# Patient Record
Sex: FEMALE | Race: WHITE | NOT HISPANIC OR LATINO | Employment: FULL TIME | URBAN - METROPOLITAN AREA
[De-identification: names, ages, dates, MRNs, and addresses within clinical notes are randomized per-mention and may not be internally consistent; named-entity substitution may affect disease eponyms.]

---

## 2017-01-04 ENCOUNTER — ALLSCRIPTS OFFICE VISIT (OUTPATIENT)
Dept: OTHER | Facility: OTHER | Age: 52
End: 2017-01-04

## 2017-01-10 ENCOUNTER — GENERIC CONVERSION - ENCOUNTER (OUTPATIENT)
Dept: OTHER | Facility: OTHER | Age: 52
End: 2017-01-10

## 2017-01-10 LAB
25(OH)D3 SERPL-MCNC: 29 NG/ML (ref 30–100)
BASOPHILS # BLD AUTO: 0 X10E3/UL (ref 0–0.2)
BASOPHILS # BLD AUTO: 1 %
DEPRECATED RDW RBC AUTO: 13.8 % (ref 12.3–15.4)
EOSINOPHIL # BLD AUTO: 0.1 X10E3/UL (ref 0–0.4)
EOSINOPHIL # BLD AUTO: 3 %
HCT VFR BLD AUTO: 42.1 % (ref 34–46.6)
HGB BLD-MCNC: 14.2 G/DL (ref 11.1–15.9)
IMM.GRANULOCYTES (CD4/8) (HISTORICAL): 0 %
IMM.GRANULOCYTES (CD4/8) (HISTORICAL): 0 X10E3/UL (ref 0–0.1)
LYMPHOCYTES # BLD AUTO: 1.6 X10E3/UL (ref 0.7–3.1)
LYMPHOCYTES # BLD AUTO: 38 %
MCH RBC QN AUTO: 28.3 PG (ref 26.6–33)
MCHC RBC AUTO-ENTMCNC: 33.7 G/DL (ref 31.5–35.7)
MCV RBC AUTO: 84 FL (ref 79–97)
MONOCYTES # BLD AUTO: 0.3 X10E3/UL (ref 0.1–0.9)
MONOCYTES (HISTORICAL): 8 %
NEUTROPHILS # BLD AUTO: 2.1 X10E3/UL (ref 1.4–7)
NEUTROPHILS # BLD AUTO: 50 %
PLATELET # BLD AUTO: 222 X10E3/UL (ref 150–379)
RBC (HISTORICAL): 5.01 X10E6/UL (ref 3.77–5.28)
WBC # BLD AUTO: 4.2 X10E3/UL (ref 3.4–10.8)

## 2017-01-11 ENCOUNTER — GENERIC CONVERSION - ENCOUNTER (OUTPATIENT)
Dept: OTHER | Facility: OTHER | Age: 52
End: 2017-01-11

## 2017-01-11 LAB
A/G RATIO (HISTORICAL): 1.5 (ref 1.1–2.5)
ALBUMIN SERPL BCP-MCNC: 4.4 G/DL (ref 3.5–5.5)
ALP SERPL-CCNC: 110 IU/L (ref 39–117)
ALT SERPL W P-5'-P-CCNC: 13 IU/L (ref 0–32)
AST SERPL W P-5'-P-CCNC: 18 IU/L (ref 0–40)
BILIRUB SERPL-MCNC: 0.5 MG/DL (ref 0–1.2)
BUN SERPL-MCNC: 11 MG/DL (ref 6–24)
BUN/CREA RATIO (HISTORICAL): 16 (ref 9–23)
CALCIUM SERPL-MCNC: 10 MG/DL (ref 8.7–10.2)
CHLORIDE SERPL-SCNC: 98 MMOL/L (ref 96–106)
CHOLEST SERPL-MCNC: 218 MG/DL (ref 100–199)
CHOLEST/HDLC SERPL: 3.4 RATIO UNITS (ref 0–4.4)
CO2 SERPL-SCNC: 25 MMOL/L (ref 18–29)
CREAT SERPL-MCNC: 0.67 MG/DL (ref 0.57–1)
EGFR AFRICAN AMERICAN (HISTORICAL): 118 ML/MIN/1.73
EGFR-AMERICAN CALC (HISTORICAL): 102 ML/MIN/1.73
GLUCOSE SERPL-MCNC: 105 MG/DL (ref 65–99)
HDLC SERPL-MCNC: 64 MG/DL
INTERPRETATION (HISTORICAL): NORMAL
LDLC SERPL CALC-MCNC: 131 MG/DL (ref 0–99)
POTASSIUM SERPL-SCNC: 4.4 MMOL/L (ref 3.5–5.2)
SODIUM SERPL-SCNC: 139 MMOL/L (ref 134–144)
TOT. GLOBULIN, SERUM (HISTORICAL): 3 G/DL (ref 1.5–4.5)
TOTAL PROTEIN (HISTORICAL): 7.4 G/DL (ref 6–8.5)
TRIGL SERPL-MCNC: 116 MG/DL (ref 0–149)
TSH SERPL DL<=0.05 MIU/L-ACNC: 1.07 UIU/ML (ref 0.45–4.5)
VLDLC SERPL CALC-MCNC: 23 MG/DL (ref 5–40)

## 2017-01-16 ENCOUNTER — ALLSCRIPTS OFFICE VISIT (OUTPATIENT)
Dept: OTHER | Facility: OTHER | Age: 52
End: 2017-01-16

## 2017-01-16 DIAGNOSIS — Z12.31 ENCOUNTER FOR SCREENING MAMMOGRAM FOR MALIGNANT NEOPLASM OF BREAST: ICD-10-CM

## 2017-02-16 ENCOUNTER — HOSPITAL ENCOUNTER (OUTPATIENT)
Dept: RADIOLOGY | Facility: HOSPITAL | Age: 52
Discharge: HOME | End: 2017-02-16
Attending: ORTHOPAEDIC SURGERY
Payer: COMMERCIAL

## 2017-02-16 ENCOUNTER — ALLSCRIPTS OFFICE VISIT (OUTPATIENT)
Dept: OTHER | Facility: OTHER | Age: 52
End: 2017-02-16

## 2017-02-16 DIAGNOSIS — M25.529 PAIN IN ELBOW: ICD-10-CM

## 2017-02-16 PROCEDURE — 73070 X-RAY EXAM OF ELBOW: CPT

## 2017-02-16 PROCEDURE — 73090 X-RAY EXAM OF FOREARM: CPT

## 2017-02-25 ENCOUNTER — APPOINTMENT (EMERGENCY)
Dept: RADIOLOGY | Facility: HOSPITAL | Age: 52
End: 2017-02-25
Payer: COMMERCIAL

## 2017-02-25 ENCOUNTER — HOSPITAL ENCOUNTER (OUTPATIENT)
Facility: HOSPITAL | Age: 52
Setting detail: OBSERVATION
Discharge: HOME | End: 2017-02-27
Attending: EMERGENCY MEDICINE | Admitting: ORTHOPAEDIC SURGERY
Payer: COMMERCIAL

## 2017-02-25 DIAGNOSIS — Z96.9 RETAINED ORTHOPEDIC HARDWARE: ICD-10-CM

## 2017-02-25 DIAGNOSIS — M25.522 LEFT ELBOW PAIN: Primary | ICD-10-CM

## 2017-02-25 LAB
ABO GROUP BLD: NORMAL
ANION GAP SERPL CALCULATED.3IONS-SCNC: 6 MMOL/L (ref 4–13)
ATRIAL RATE: 72 BPM
BASOPHILS # BLD AUTO: 0.01 THOUSANDS/ΜL (ref 0–0.1)
BASOPHILS NFR BLD AUTO: 0 % (ref 0–1)
BLD GP AB SCN SERPL QL: NEGATIVE
BUN SERPL-MCNC: 12 MG/DL (ref 5–25)
CALCIUM SERPL-MCNC: 9.4 MG/DL (ref 8.3–10.1)
CHLORIDE SERPL-SCNC: 101 MMOL/L (ref 100–108)
CO2 SERPL-SCNC: 29 MMOL/L (ref 21–32)
CREAT SERPL-MCNC: 0.67 MG/DL (ref 0.6–1.3)
CRP SERPL HS-MCNC: >90 MG/L
EOSINOPHIL # BLD AUTO: 0.01 THOUSAND/ΜL (ref 0–0.61)
EOSINOPHIL NFR BLD AUTO: 0 % (ref 0–6)
ERYTHROCYTE [DISTWIDTH] IN BLOOD BY AUTOMATED COUNT: 13.1 % (ref 11.6–15.1)
ERYTHROCYTE [SEDIMENTATION RATE] IN BLOOD: 44 MM/HOUR (ref 0–20)
GFR SERPL CREATININE-BSD FRML MDRD: >60 ML/MIN/1.73SQ M
GLUCOSE SERPL-MCNC: 110 MG/DL (ref 65–140)
HCT VFR BLD AUTO: 39.8 % (ref 34.8–46.1)
HGB BLD-MCNC: 13.6 G/DL (ref 11.5–15.4)
LYMPHOCYTES # BLD AUTO: 1.68 THOUSANDS/ΜL (ref 0.6–4.47)
LYMPHOCYTES NFR BLD AUTO: 19 % (ref 14–44)
MCH RBC QN AUTO: 29.2 PG (ref 26.8–34.3)
MCHC RBC AUTO-ENTMCNC: 34.2 G/DL (ref 31.4–37.4)
MCV RBC AUTO: 85 FL (ref 82–98)
MONOCYTES # BLD AUTO: 0.87 THOUSAND/ΜL (ref 0.17–1.22)
MONOCYTES NFR BLD AUTO: 10 % (ref 4–12)
NEUTROPHILS # BLD AUTO: 6.26 THOUSANDS/ΜL (ref 1.85–7.62)
NEUTS SEG NFR BLD AUTO: 71 % (ref 43–75)
NRBC BLD AUTO-RTO: 0 /100 WBCS
P AXIS: 66 DEGREES
PLATELET # BLD AUTO: 185 THOUSANDS/UL (ref 149–390)
PMV BLD AUTO: 9.7 FL (ref 8.9–12.7)
POTASSIUM SERPL-SCNC: 3.6 MMOL/L (ref 3.5–5.3)
PR INTERVAL: 134 MS
QRS AXIS: 73 DEGREES
QRSD INTERVAL: 82 MS
QT INTERVAL: 372 MS
QTC INTERVAL: 407 MS
RBC # BLD AUTO: 4.66 MILLION/UL (ref 3.81–5.12)
RH BLD: POSITIVE
SODIUM SERPL-SCNC: 136 MMOL/L (ref 136–145)
T WAVE AXIS: 45 DEGREES
VENTRICULAR RATE: 72 BPM
WBC # BLD AUTO: 8.85 THOUSAND/UL (ref 4.31–10.16)

## 2017-02-25 PROCEDURE — 36415 COLL VENOUS BLD VENIPUNCTURE: CPT | Performed by: EMERGENCY MEDICINE

## 2017-02-25 PROCEDURE — 86141 C-REACTIVE PROTEIN HS: CPT | Performed by: EMERGENCY MEDICINE

## 2017-02-25 PROCEDURE — 86900 BLOOD TYPING SEROLOGIC ABO: CPT | Performed by: ORTHOPAEDIC SURGERY

## 2017-02-25 PROCEDURE — 99285 EMERGENCY DEPT VISIT HI MDM: CPT

## 2017-02-25 PROCEDURE — 85025 COMPLETE CBC W/AUTO DIFF WBC: CPT | Performed by: EMERGENCY MEDICINE

## 2017-02-25 PROCEDURE — 80048 BASIC METABOLIC PNL TOTAL CA: CPT | Performed by: EMERGENCY MEDICINE

## 2017-02-25 PROCEDURE — 73080 X-RAY EXAM OF ELBOW: CPT

## 2017-02-25 PROCEDURE — A9270 NON-COVERED ITEM OR SERVICE: HCPCS | Performed by: EMERGENCY MEDICINE

## 2017-02-25 PROCEDURE — 86901 BLOOD TYPING SEROLOGIC RH(D): CPT | Performed by: ORTHOPAEDIC SURGERY

## 2017-02-25 PROCEDURE — 73201 CT UPPER EXTREMITY W/DYE: CPT

## 2017-02-25 PROCEDURE — 86850 RBC ANTIBODY SCREEN: CPT | Performed by: ORTHOPAEDIC SURGERY

## 2017-02-25 PROCEDURE — 93005 ELECTROCARDIOGRAM TRACING: CPT | Performed by: ORTHOPAEDIC SURGERY

## 2017-02-25 PROCEDURE — 85652 RBC SED RATE AUTOMATED: CPT | Performed by: EMERGENCY MEDICINE

## 2017-02-25 RX ORDER — SODIUM CHLORIDE, SODIUM LACTATE, POTASSIUM CHLORIDE, CALCIUM CHLORIDE 600; 310; 30; 20 MG/100ML; MG/100ML; MG/100ML; MG/100ML
100 INJECTION, SOLUTION INTRAVENOUS CONTINUOUS
Status: DISCONTINUED | OUTPATIENT
Start: 2017-02-26 | End: 2017-02-26

## 2017-02-25 RX ORDER — OXYCODONE HYDROCHLORIDE 10 MG/1
10 TABLET ORAL EVERY 4 HOURS PRN
Status: DISCONTINUED | OUTPATIENT
Start: 2017-02-25 | End: 2017-02-27 | Stop reason: HOSPADM

## 2017-02-25 RX ORDER — OXYCODONE HYDROCHLORIDE 5 MG/1
5 TABLET ORAL EVERY 4 HOURS PRN
Status: DISCONTINUED | OUTPATIENT
Start: 2017-02-25 | End: 2017-02-27 | Stop reason: HOSPADM

## 2017-02-25 RX ORDER — AMOXICILLIN 500 MG/1
500 TABLET, FILM COATED ORAL 2 TIMES DAILY
COMMUNITY
End: 2017-02-27 | Stop reason: HOSPADM

## 2017-02-25 RX ORDER — OXYCODONE HYDROCHLORIDE AND ACETAMINOPHEN 5; 325 MG/1; MG/1
1 TABLET ORAL ONCE
Status: COMPLETED | OUTPATIENT
Start: 2017-02-25 | End: 2017-02-25

## 2017-02-25 RX ORDER — ONDANSETRON 2 MG/ML
4 INJECTION INTRAMUSCULAR; INTRAVENOUS EVERY 6 HOURS PRN
Status: DISCONTINUED | OUTPATIENT
Start: 2017-02-25 | End: 2017-02-27 | Stop reason: HOSPADM

## 2017-02-25 RX ORDER — ACETAMINOPHEN 325 MG/1
650 TABLET ORAL EVERY 4 HOURS PRN
Status: DISCONTINUED | OUTPATIENT
Start: 2017-02-25 | End: 2017-02-27 | Stop reason: HOSPADM

## 2017-02-25 RX ADMIN — IOHEXOL 85 ML: 350 INJECTION, SOLUTION INTRAVENOUS at 10:50

## 2017-02-25 RX ADMIN — OXYCODONE HYDROCHLORIDE AND ACETAMINOPHEN 1 TABLET: 5; 325 TABLET ORAL at 07:50

## 2017-02-26 ENCOUNTER — ANESTHESIA (OUTPATIENT)
Dept: PERIOP | Facility: HOSPITAL | Age: 52
End: 2017-02-26
Payer: COMMERCIAL

## 2017-02-26 ENCOUNTER — APPOINTMENT (OUTPATIENT)
Dept: RADIOLOGY | Facility: HOSPITAL | Age: 52
End: 2017-02-26
Payer: COMMERCIAL

## 2017-02-26 ENCOUNTER — ANESTHESIA EVENT (OUTPATIENT)
Dept: PERIOP | Facility: HOSPITAL | Age: 52
End: 2017-02-26
Payer: COMMERCIAL

## 2017-02-26 PROBLEM — T85.848A PAIN FROM IMPLANTED HARDWARE: Status: ACTIVE | Noted: 2017-02-26

## 2017-02-26 PROCEDURE — A9270 NON-COVERED ITEM OR SERVICE: HCPCS | Performed by: ORTHOPAEDIC SURGERY

## 2017-02-26 PROCEDURE — 87176 TISSUE HOMOGENIZATION CULTR: CPT | Performed by: ORTHOPAEDIC SURGERY

## 2017-02-26 PROCEDURE — 87070 CULTURE OTHR SPECIMN AEROBIC: CPT | Performed by: ORTHOPAEDIC SURGERY

## 2017-02-26 PROCEDURE — 87205 SMEAR GRAM STAIN: CPT | Performed by: ORTHOPAEDIC SURGERY

## 2017-02-26 PROCEDURE — 73070 X-RAY EXAM OF ELBOW: CPT

## 2017-02-26 PROCEDURE — 87206 SMEAR FLUORESCENT/ACID STAI: CPT | Performed by: ORTHOPAEDIC SURGERY

## 2017-02-26 PROCEDURE — 87075 CULTR BACTERIA EXCEPT BLOOD: CPT | Performed by: ORTHOPAEDIC SURGERY

## 2017-02-26 PROCEDURE — 87102 FUNGUS ISOLATION CULTURE: CPT | Performed by: ORTHOPAEDIC SURGERY

## 2017-02-26 PROCEDURE — 87116 MYCOBACTERIA CULTURE: CPT | Performed by: ORTHOPAEDIC SURGERY

## 2017-02-26 RX ORDER — MIDAZOLAM HYDROCHLORIDE 1 MG/ML
INJECTION INTRAMUSCULAR; INTRAVENOUS AS NEEDED
Status: DISCONTINUED | OUTPATIENT
Start: 2017-02-26 | End: 2017-02-26 | Stop reason: SURG

## 2017-02-26 RX ORDER — LIDOCAINE HYDROCHLORIDE 10 MG/ML
INJECTION, SOLUTION INFILTRATION; PERINEURAL AS NEEDED
Status: DISCONTINUED | OUTPATIENT
Start: 2017-02-26 | End: 2017-02-26 | Stop reason: SURG

## 2017-02-26 RX ORDER — ONDANSETRON 2 MG/ML
INJECTION INTRAMUSCULAR; INTRAVENOUS AS NEEDED
Status: DISCONTINUED | OUTPATIENT
Start: 2017-02-26 | End: 2017-02-26 | Stop reason: SURG

## 2017-02-26 RX ORDER — CEFAZOLIN SODIUM 1 G/3ML
INJECTION, POWDER, FOR SOLUTION INTRAMUSCULAR; INTRAVENOUS AS NEEDED
Status: DISCONTINUED | OUTPATIENT
Start: 2017-02-26 | End: 2017-02-26 | Stop reason: SURG

## 2017-02-26 RX ORDER — MAGNESIUM HYDROXIDE 1200 MG/15ML
LIQUID ORAL AS NEEDED
Status: DISCONTINUED | OUTPATIENT
Start: 2017-02-26 | End: 2017-02-26 | Stop reason: HOSPADM

## 2017-02-26 RX ORDER — OXYCODONE HYDROCHLORIDE 5 MG/1
TABLET ORAL
Qty: 30 TABLET | Refills: 0 | Status: SHIPPED | OUTPATIENT
Start: 2017-02-26 | End: 2020-11-20 | Stop reason: ALTCHOICE

## 2017-02-26 RX ORDER — PROPOFOL 10 MG/ML
INJECTION, EMULSION INTRAVENOUS AS NEEDED
Status: DISCONTINUED | OUTPATIENT
Start: 2017-02-26 | End: 2017-02-26 | Stop reason: SURG

## 2017-02-26 RX ORDER — FENTANYL CITRATE/PF 50 MCG/ML
25 SYRINGE (ML) INJECTION
Status: DISCONTINUED | OUTPATIENT
Start: 2017-02-26 | End: 2017-02-26 | Stop reason: HOSPADM

## 2017-02-26 RX ORDER — EPHEDRINE SULFATE 50 MG/ML
INJECTION, SOLUTION INTRAVENOUS AS NEEDED
Status: DISCONTINUED | OUTPATIENT
Start: 2017-02-26 | End: 2017-02-26 | Stop reason: SURG

## 2017-02-26 RX ORDER — ONDANSETRON 2 MG/ML
4 INJECTION INTRAMUSCULAR; INTRAVENOUS AS NEEDED
Status: DISCONTINUED | OUTPATIENT
Start: 2017-02-26 | End: 2017-02-26 | Stop reason: HOSPADM

## 2017-02-26 RX ORDER — BUPIVACAINE HYDROCHLORIDE 2.5 MG/ML
INJECTION, SOLUTION EPIDURAL; INFILTRATION; INTRACAUDAL AS NEEDED
Status: DISCONTINUED | OUTPATIENT
Start: 2017-02-26 | End: 2017-02-26 | Stop reason: HOSPADM

## 2017-02-26 RX ORDER — FENTANYL CITRATE 50 UG/ML
INJECTION, SOLUTION INTRAMUSCULAR; INTRAVENOUS AS NEEDED
Status: DISCONTINUED | OUTPATIENT
Start: 2017-02-26 | End: 2017-02-26 | Stop reason: SURG

## 2017-02-26 RX ORDER — METOCLOPRAMIDE HYDROCHLORIDE 5 MG/ML
10 INJECTION INTRAMUSCULAR; INTRAVENOUS ONCE AS NEEDED
Status: DISCONTINUED | OUTPATIENT
Start: 2017-02-26 | End: 2017-02-26 | Stop reason: HOSPADM

## 2017-02-26 RX ORDER — SODIUM CHLORIDE, SODIUM LACTATE, POTASSIUM CHLORIDE, CALCIUM CHLORIDE 600; 310; 30; 20 MG/100ML; MG/100ML; MG/100ML; MG/100ML
75 INJECTION, SOLUTION INTRAVENOUS CONTINUOUS
Status: DISCONTINUED | OUTPATIENT
Start: 2017-02-26 | End: 2017-02-27 | Stop reason: HOSPADM

## 2017-02-26 RX ADMIN — MIDAZOLAM HYDROCHLORIDE 2 MG: 1 INJECTION, SOLUTION INTRAMUSCULAR; INTRAVENOUS at 07:57

## 2017-02-26 RX ADMIN — SODIUM CHLORIDE, SODIUM LACTATE, POTASSIUM CHLORIDE, AND CALCIUM CHLORIDE 100 ML/HR: .6; .31; .03; .02 INJECTION, SOLUTION INTRAVENOUS at 00:08

## 2017-02-26 RX ADMIN — LIDOCAINE HYDROCHLORIDE 50 MG: 10 INJECTION, SOLUTION INFILTRATION; PERINEURAL at 08:00

## 2017-02-26 RX ADMIN — SODIUM CHLORIDE, SODIUM LACTATE, POTASSIUM CHLORIDE, AND CALCIUM CHLORIDE 100 ML/HR: .6; .31; .03; .02 INJECTION, SOLUTION INTRAVENOUS at 09:18

## 2017-02-26 RX ADMIN — OXYCODONE HYDROCHLORIDE 5 MG: 5 TABLET ORAL at 20:37

## 2017-02-26 RX ADMIN — PROPOFOL 200 MG: 10 INJECTION, EMULSION INTRAVENOUS at 08:00

## 2017-02-26 RX ADMIN — CEFAZOLIN 1000 MG: 1 INJECTION, POWDER, FOR SOLUTION INTRAVENOUS at 08:48

## 2017-02-26 RX ADMIN — OXYCODONE HYDROCHLORIDE 5 MG: 5 TABLET ORAL at 00:47

## 2017-02-26 RX ADMIN — CEFAZOLIN SODIUM 2000 MG: 2 SOLUTION INTRAVENOUS at 16:44

## 2017-02-26 RX ADMIN — FENTANYL CITRATE 50 MCG: 50 INJECTION, SOLUTION INTRAMUSCULAR; INTRAVENOUS at 08:46

## 2017-02-26 RX ADMIN — OXYCODONE HYDROCHLORIDE 5 MG: 5 TABLET ORAL at 10:43

## 2017-02-26 RX ADMIN — ONDANSETRON 4 MG: 2 INJECTION INTRAMUSCULAR; INTRAVENOUS at 08:13

## 2017-02-26 RX ADMIN — FENTANYL CITRATE 50 MCG: 50 INJECTION, SOLUTION INTRAMUSCULAR; INTRAVENOUS at 08:39

## 2017-02-26 RX ADMIN — EPHEDRINE SULFATE 10 MG: 50 INJECTION, SOLUTION INTRAMUSCULAR; INTRAVENOUS; SUBCUTANEOUS at 08:30

## 2017-02-26 RX ADMIN — FENTANYL CITRATE 50 MCG: 50 INJECTION, SOLUTION INTRAMUSCULAR; INTRAVENOUS at 08:05

## 2017-02-26 RX ADMIN — SODIUM CHLORIDE, SODIUM LACTATE, POTASSIUM CHLORIDE, AND CALCIUM CHLORIDE 75 ML/HR: .6; .31; .03; .02 INJECTION, SOLUTION INTRAVENOUS at 10:01

## 2017-02-26 RX ADMIN — SODIUM CHLORIDE, SODIUM LACTATE, POTASSIUM CHLORIDE, AND CALCIUM CHLORIDE: .6; .31; .03; .02 INJECTION, SOLUTION INTRAVENOUS at 07:50

## 2017-02-27 ENCOUNTER — GENERIC CONVERSION - ENCOUNTER (OUTPATIENT)
Dept: OTHER | Facility: OTHER | Age: 52
End: 2017-02-27

## 2017-02-27 VITALS
DIASTOLIC BLOOD PRESSURE: 70 MMHG | HEART RATE: 106 BPM | WEIGHT: 146 LBS | TEMPERATURE: 98 F | OXYGEN SATURATION: 96 % | HEIGHT: 67 IN | SYSTOLIC BLOOD PRESSURE: 116 MMHG | RESPIRATION RATE: 18 BRPM | BODY MASS INDEX: 22.91 KG/M2

## 2017-02-27 LAB
BASOPHILS # BLD AUTO: 0.01 THOUSANDS/ΜL (ref 0–0.1)
BASOPHILS NFR BLD AUTO: 0 % (ref 0–1)
CRP SERPL HS-MCNC: >90 MG/L
EOSINOPHIL # BLD AUTO: 0.03 THOUSAND/ΜL (ref 0–0.61)
EOSINOPHIL NFR BLD AUTO: 1 % (ref 0–6)
ERYTHROCYTE [DISTWIDTH] IN BLOOD BY AUTOMATED COUNT: 13.3 % (ref 11.6–15.1)
HCT VFR BLD AUTO: 37.4 % (ref 34.8–46.1)
HGB BLD-MCNC: 11.8 G/DL (ref 11.5–15.4)
LYMPHOCYTES # BLD AUTO: 1.49 THOUSANDS/ΜL (ref 0.6–4.47)
LYMPHOCYTES NFR BLD AUTO: 33 % (ref 14–44)
MCH RBC QN AUTO: 27.6 PG (ref 26.8–34.3)
MCHC RBC AUTO-ENTMCNC: 31.6 G/DL (ref 31.4–37.4)
MCV RBC AUTO: 88 FL (ref 82–98)
MONOCYTES # BLD AUTO: 0.48 THOUSAND/ΜL (ref 0.17–1.22)
MONOCYTES NFR BLD AUTO: 11 % (ref 4–12)
NEUTROPHILS # BLD AUTO: 2.48 THOUSANDS/ΜL (ref 1.85–7.62)
NEUTS SEG NFR BLD AUTO: 55 % (ref 43–75)
NRBC BLD AUTO-RTO: 0 /100 WBCS
PLATELET # BLD AUTO: 180 THOUSANDS/UL (ref 149–390)
PMV BLD AUTO: 9.7 FL (ref 8.9–12.7)
RBC # BLD AUTO: 4.27 MILLION/UL (ref 3.81–5.12)
WBC # BLD AUTO: 4.5 THOUSAND/UL (ref 4.31–10.16)

## 2017-02-27 PROCEDURE — A9270 NON-COVERED ITEM OR SERVICE: HCPCS | Performed by: ORTHOPAEDIC SURGERY

## 2017-02-27 PROCEDURE — 86141 C-REACTIVE PROTEIN HS: CPT | Performed by: ORTHOPAEDIC SURGERY

## 2017-02-27 PROCEDURE — 85025 COMPLETE CBC W/AUTO DIFF WBC: CPT | Performed by: ORTHOPAEDIC SURGERY

## 2017-02-27 RX ORDER — NAPROXEN 500 MG/1
500 TABLET ORAL 2 TIMES DAILY WITH MEALS
Qty: 60 TABLET | Refills: 0 | Status: SHIPPED | OUTPATIENT
Start: 2017-02-27 | End: 2020-11-20 | Stop reason: ALTCHOICE

## 2017-02-27 RX ORDER — CEPHALEXIN 500 MG/1
500 CAPSULE ORAL 3 TIMES DAILY
Qty: 40 CAPSULE | Refills: 0 | Status: SHIPPED | OUTPATIENT
Start: 2017-02-27 | End: 2017-03-09

## 2017-02-27 RX ADMIN — OXYCODONE HYDROCHLORIDE 5 MG: 5 TABLET ORAL at 00:41

## 2017-02-27 RX ADMIN — CEFAZOLIN SODIUM 2000 MG: 2 SOLUTION INTRAVENOUS at 00:41

## 2017-03-01 ENCOUNTER — APPOINTMENT (OUTPATIENT)
Dept: OCCUPATIONAL THERAPY | Facility: CLINIC | Age: 52
End: 2017-03-01
Payer: COMMERCIAL

## 2017-03-01 LAB
BACTERIA SPEC ANAEROBE CULT: NO GROWTH
BACTERIA TISS AEROBE CULT: NO GROWTH
GRAM STN SPEC: NORMAL

## 2017-03-01 PROCEDURE — 97165 OT EVAL LOW COMPLEX 30 MIN: CPT

## 2017-03-06 ENCOUNTER — APPOINTMENT (OUTPATIENT)
Dept: OCCUPATIONAL THERAPY | Facility: CLINIC | Age: 52
End: 2017-03-06
Payer: COMMERCIAL

## 2017-03-06 PROCEDURE — 97140 MANUAL THERAPY 1/> REGIONS: CPT

## 2017-03-06 PROCEDURE — 97110 THERAPEUTIC EXERCISES: CPT

## 2017-03-08 ENCOUNTER — APPOINTMENT (OUTPATIENT)
Dept: OCCUPATIONAL THERAPY | Facility: CLINIC | Age: 52
End: 2017-03-08
Payer: COMMERCIAL

## 2017-03-08 PROCEDURE — 97140 MANUAL THERAPY 1/> REGIONS: CPT

## 2017-03-08 PROCEDURE — 97110 THERAPEUTIC EXERCISES: CPT

## 2017-03-09 ENCOUNTER — ALLSCRIPTS OFFICE VISIT (OUTPATIENT)
Dept: OTHER | Facility: OTHER | Age: 52
End: 2017-03-09

## 2017-03-13 ENCOUNTER — APPOINTMENT (OUTPATIENT)
Dept: OCCUPATIONAL THERAPY | Facility: CLINIC | Age: 52
End: 2017-03-13
Payer: COMMERCIAL

## 2017-03-15 ENCOUNTER — APPOINTMENT (OUTPATIENT)
Dept: OCCUPATIONAL THERAPY | Facility: CLINIC | Age: 52
End: 2017-03-15
Payer: COMMERCIAL

## 2017-03-20 ENCOUNTER — APPOINTMENT (OUTPATIENT)
Dept: OCCUPATIONAL THERAPY | Facility: CLINIC | Age: 52
End: 2017-03-20
Payer: COMMERCIAL

## 2017-03-20 PROCEDURE — 97140 MANUAL THERAPY 1/> REGIONS: CPT

## 2017-03-20 PROCEDURE — 97110 THERAPEUTIC EXERCISES: CPT

## 2017-03-22 ENCOUNTER — APPOINTMENT (OUTPATIENT)
Dept: OCCUPATIONAL THERAPY | Facility: CLINIC | Age: 52
End: 2017-03-22
Payer: COMMERCIAL

## 2017-03-27 ENCOUNTER — APPOINTMENT (OUTPATIENT)
Dept: OCCUPATIONAL THERAPY | Facility: CLINIC | Age: 52
End: 2017-03-27
Payer: COMMERCIAL

## 2017-03-27 LAB — FUNGUS SPEC CULT: NORMAL

## 2017-03-29 ENCOUNTER — APPOINTMENT (OUTPATIENT)
Dept: OCCUPATIONAL THERAPY | Facility: CLINIC | Age: 52
End: 2017-03-29
Payer: COMMERCIAL

## 2017-04-06 ENCOUNTER — ALLSCRIPTS OFFICE VISIT (OUTPATIENT)
Dept: OTHER | Facility: OTHER | Age: 52
End: 2017-04-06

## 2017-04-18 LAB
MYCOBACTERIUM SPEC CULT: NORMAL
RHODAMINE-AURAMINE STN SPEC: NORMAL

## 2018-01-10 NOTE — CONSULTS
Signatures   Electronically signed by : WARD Lemos ; Mar  9 2017  3:42PM EST                       (Author)

## 2018-01-11 NOTE — MISCELLANEOUS
Message  Return to work or school:   Karma Ortega is under my professional care  She was seen in my office on 02/16/2017    She is not able to return to work until 03/13/2017      Ale Juarez PA-C  Signatures   Electronically signed by :  Ale Juarez, [facility]; Feb 27 2017  3:49PM EST                       (Author)

## 2018-01-11 NOTE — PROGRESS NOTES
Assessment    1  Aftercare following other surgery of musculoskeletal system (V58 78) (Z47 89)    Plan  Aftercare following other surgery of musculoskeletal system    · Follow-up visit in 2 months Evaluation and Treatment  Follow-up  Status: Hold For -  Scheduling  Requested for: [id number]    Post-Op  HPI: 59-year-old female, status post washout of left elbow secondary to acute onset pain with associated infection marker level elevations  Patient states she feels much better today  She is able to have the same range of motion that she had prior to the new onset elbow pain  States that the tingling sensation over the thumb, index and middle digit are improving but They are present      Active Problems    1  Acute bronchitis, unspecified organism (466 0) (J20 9)   2  Acute viral conjunctivitis of both eyes (077 99) (B30 9)   3  Aftercare following other surgery of musculoskeletal system (V58 78) (Z47 89)   4  Body mass index (BMI) of 24 0 to 24 9 in adult (V85 1) (Z68 24)   5  Closed displaced fracture of medial condyle of left humerus with routine healing,   subsequent encounter (V54 11) (S42 462D)   6  Elbow pain (719 42) (M25 529)   7  Encounter for colorectal cancer screening (V76 51) (Z12 11,Z12 12)   8  Encounter for mammogram to establish baseline mammogram (V76 12) (Z12 31)   9  Fatigue (780 79) (R53 83)   10  Fracture of radial shaft, with ulna, left, open, type I or II, with routine healing, subsequent    encounter (V54 12) (S52 202E,S52 302E)   11  Indigestion (536 8) (K30)   12  Menopause (627 2) (Z78 0)   13  Pain due to bone fixation device, initial encounter (996 78,338 18) (T84 84XA)   14  Palpitations (785 1) (R00 2)   15  Shortness of breath (786 05) (R06 02)    Social History    · Moderate alcohol use   · Never smoked    Current Meds   1  Sulfacetamide Sodium 10 % Ophthalmic Solution; INSTILL 2 DROP Every 4 hours   affected eye;    Therapy: [id number] to (Last Rx:16Jan2017)  Requested for: [id number] Ordered    Allergies    1   No Known Drug Allergies    Vitals   Recorded: 90RMV2088 01:24PM   Heart Rate 83   Systolic 847   Diastolic 81   Height 5 ft    Weight 150 lb 2 08 oz   BMI Calculated 29 32   BSA Calculated 1 64     Physical Exam  Left elbow: Incision site well approximated with sutures intact, patient lacks about 10 degrees  of full extension but is able to flex to at least to 130 degrees , motor function intact throughout distally subjective tingling sensation over the distal aspects of the thumb, index and middle digit, brisk capillary refill         Signatures   Electronically signed by : WARD Montalvo ; Mar  9 2017  3:42PM EST                       (Author)

## 2018-01-12 NOTE — RESULT NOTES
Verified Results  (1) CBC/PLT/DIFF [id number] 07:40AM Josephine Meng     Test Name Result Flag Reference   WBC 4 2 x10E3/uL  3 4-10 8   RBC 5 01 x10E6/uL  3 77-5 28   Hemoglobin 14 2 g/dL  11 1-15 9   Hematocrit 42 1 %  34 0-46  6   MCV 84 fL  79-97   MCH 28 3 pg  26 6-33 0   MCHC 33 7 g/dL  31 5-35 7   RDW 13 8 %  12 3-15 4   Platelets 150 H91S8/LY  150-379   Neutrophils 50 %     Lymphs 38 %     Monocytes 8 %     Eos 3 %     Basos 1 %     Neutrophils (Absolute) 2 1 x10E3/uL  1 4-7 0   Lymphs (Absolute) 1 6 x10E3/uL  0 7-3 1   Monocytes(Absolute) 0 3 x10E3/uL  0 1-0 9   Eos (Absolute) 0 1 x10E3/uL  0 0-0 4   Baso (Absolute) 0 0 x10E3/uL  0 0-0 2   Immature Granulocytes 0 %     Immature Grans (Abs) 0 0 x10E3/uL  0 0-0 1     (1) COMPREHENSIVE METABOLIC PANEL [id number] 57:69YP Josephine Meng     Test Name Result Flag Reference   Glucose, Serum 105 mg/dL H 65-99   BUN 11 mg/dL  6-24   Creatinine, Serum 0 67 mg/dL  0 57-1 00   eGFR If NonAfricn Am 102 mL/min/1 73  >59   eGFR If Africn Am 118 mL/min/1 73  >59   BUN/Creatinine Ratio 16  9-23   Sodium, Serum 139 mmol/L  134-144   Potassium, Serum 4 4 mmol/L  3 5-5 2   Chloride, Serum 98 mmol/L     Carbon Dioxide, Total 25 mmol/L  18-29   Calcium, Serum 10 0 mg/dL  8 7-10 2   Protein, Total, Serum 7 4 g/dL  6 0-8 5   Albumin, Serum 4 4 g/dL  3 5-5 5   Globulin, Total 3 0 g/dL  1 5-4 5   A/G Ratio 1 5  1 1-2 5   Bilirubin, Total 0 5 mg/dL  0 0-1 2   Alkaline Phosphatase, S 110 IU/L     AST (SGOT) 18 IU/L  0-40   ALT (SGPT) 13 IU/L  0-32     (1) LIPID PANEL, FASTING [id number] 07:40AM Josephine Meng     Test Name Result Flag Reference   Cholesterol, Total 218 mg/dL H 100-199   Triglycerides 116 mg/dL  0-149   HDL Cholesterol 64 mg/dL  >39   VLDL Cholesterol Jani 23 mg/dL  5-40   LDL Cholesterol Calc 131 mg/dL H 0-99   T  Chol/HDL Ratio 3 4 ratio units  0 0-4 4   T   Chol/HDL Ratio                                                             Men  Women 1/2 Avg  Risk  3 4    3 3                                                   Avg Risk  5 0    4 4                                                2X Avg  Risk  9 6    7 1                                                3X Avg  Risk 23 4   11 0     (1) TSH WITH FT4 REFLEX [id number] 07:40AM Josephine Meng     Test Name Result Flag Reference   TSH 1 070 uIU/mL  0 450-4 500     (1) VITAMIN D 25-HYDROXY [id number] 07:40AM Josephine Meng     Test Name Result Flag Reference   Vitamin D, 25-Hydroxy 29 0 ng/mL L 30 0-100 0   Vitamin D deficiency has been defined by the [address] practice guideline as a  level of serum 25-OH vitamin D less than 20 ng/mL (1,2)  The Endocrine Society went on to further define vitamin D  insufficiency as a level between 21 and 29 ng/mL (2)  1  IOM ([place] [employer])  2010  Dietary reference     intakes for calcium and D  [facility]: The     [employer]  2  Gita MF, [place], Adalberto JOHNSON, et al      Evaluation, treatment, and prevention of vitamin D     deficiency: an Endocrine Society clinical practice     guideline  JCEM  2011 Jul; 96(7):1911-30  [facility]) Cardiovascular Risk Assessment [id number] 07:40AM Aaron Gallagher     Test Name Result Flag Reference   Interpretation Note     Supplement report is available  PDF Image   Discussion/Summary   Candace Villalba- your blood counts, kidney, liver and thyroid function are normal   Cholesterol is normal   Your sugar is elevated by a few points  Please follow a low carbohydrate diet  FLOYD Solano     Signatures   Electronically signed by : Radha Hearn; Jan 11 2017 11:55AM EST                       (Author)

## 2018-01-13 VITALS
DIASTOLIC BLOOD PRESSURE: 78 MMHG | HEIGHT: 60 IN | SYSTOLIC BLOOD PRESSURE: 120 MMHG | BODY MASS INDEX: 29.06 KG/M2 | WEIGHT: 148 LBS | TEMPERATURE: 97.7 F | HEART RATE: 100 BPM | RESPIRATION RATE: 20 BRPM

## 2018-01-14 VITALS
WEIGHT: 151 LBS | SYSTOLIC BLOOD PRESSURE: 120 MMHG | BODY MASS INDEX: 24.27 KG/M2 | HEART RATE: 84 BPM | DIASTOLIC BLOOD PRESSURE: 88 MMHG | TEMPERATURE: 98.5 F | RESPIRATION RATE: 20 BRPM | HEIGHT: 66 IN

## 2018-01-14 VITALS
TEMPERATURE: 98.4 F | WEIGHT: 157 LBS | DIASTOLIC BLOOD PRESSURE: 80 MMHG | SYSTOLIC BLOOD PRESSURE: 110 MMHG | HEIGHT: 60 IN | BODY MASS INDEX: 30.82 KG/M2 | RESPIRATION RATE: 14 BRPM | HEART RATE: 72 BPM

## 2018-01-14 VITALS
WEIGHT: 150.25 LBS | BODY MASS INDEX: 29.34 KG/M2 | HEART RATE: 99 BPM | DIASTOLIC BLOOD PRESSURE: 83 MMHG | SYSTOLIC BLOOD PRESSURE: 119 MMHG | RESPIRATION RATE: 18 BRPM

## 2018-01-15 VITALS
HEIGHT: 60 IN | BODY MASS INDEX: 29.48 KG/M2 | WEIGHT: 150.13 LBS | SYSTOLIC BLOOD PRESSURE: 125 MMHG | DIASTOLIC BLOOD PRESSURE: 81 MMHG | HEART RATE: 83 BPM

## 2018-01-15 NOTE — CONSULTS
Signatures   Electronically signed by : WARD Cazares ; Mar  9 2017  3:43PM EST                       (Author)

## 2018-01-18 NOTE — PROGRESS NOTES
Assessment    1  Aftercare following other surgery of musculoskeletal system (V58 78) (Z47 89)    Plan  Aftercare following other surgery of musculoskeletal system    · Follow-up visit in 2 months Evaluation and Treatment  Follow-up  Status: Hold For -  Scheduling  Requested for: [id number]    Discussion/Summary    Pain is as follows:    Weightbearing as tolerated    Range of motion exercises as well as strengthening  We'll follow-up with patient within the next 2-3 months  Discussed treatment plan with patient and they are in agreement with treatment plan  Thank you  Active Problems    1  Acute bronchitis, unspecified organism (466 0) (J20 9)   2  Acute viral conjunctivitis of both eyes (077 99) (B30 9)   3  Body mass index (BMI) of 24 0 to 24 9 in adult (V85 1) (Z68 24)   4  Closed displaced fracture of medial condyle of left humerus with routine healing,   subsequent encounter (V54 11) (S42 462D)   5  Elbow pain (719 42) (M25 529)   6  Encounter for colorectal cancer screening (V76 51) (Z12 11,Z12 12)   7  Encounter for mammogram to establish baseline mammogram (V76 12) (Z12 31)   8  Fatigue (780 79) (R53 83)   9  Fracture of radial shaft, with ulna, left, open, type I or II, with routine healing, subsequent   encounter (V54 12) (S52 202E,S52 302E)   10  Indigestion (536 8) (K30)   11  Menopause (627 2) (Z78 0)   12  Pain due to bone fixation device, initial encounter (996 78,338 18) (T84 84XA)   13  Palpitations (785 1) (R00 2)   14  Shortness of breath (786 05) (R06 02)    Social History    · Moderate alcohol use   · Never smoked    Current Meds   1  Sulfacetamide Sodium 10 % Ophthalmic Solution; INSTILL 2 DROP Every 4 hours   affected eye; Therapy: [id number] to (Last Rx:16Jan2017)  Requested for: 53NHD7659 Ordered    Allergies    1   No Known Drug Allergies    Vitals   Recorded: 87GVW2443 01:24PM   Heart Rate 83   Systolic 780   Diastolic 81   Height 5 ft    Weight 150 lb 2 08 oz   BMI Calculated 29 32   BSA Calculated 1 64     Message  Return to work or school:   Philip Rodgers is under my professional care  She was seen in my office on March 9, 2017       May start work on March 13, 2017  Thank you          Signatures   Electronically signed by : WARD Lopez ; Mar  9 2017  3:43PM EST                       (Author)

## 2020-09-03 ENCOUNTER — OFFICE VISIT (OUTPATIENT)
Dept: URGENT CARE | Facility: CLINIC | Age: 55
End: 2020-09-03
Payer: COMMERCIAL

## 2020-09-03 VITALS
TEMPERATURE: 97.6 F | HEIGHT: 67 IN | SYSTOLIC BLOOD PRESSURE: 136 MMHG | BODY MASS INDEX: 25.43 KG/M2 | DIASTOLIC BLOOD PRESSURE: 98 MMHG | WEIGHT: 162 LBS | OXYGEN SATURATION: 98 % | HEART RATE: 99 BPM | RESPIRATION RATE: 18 BRPM

## 2020-09-03 DIAGNOSIS — R05.9 COUGH: Primary | ICD-10-CM

## 2020-09-03 PROCEDURE — 99241 PR OFFICE CONSULTATION NEW/ESTAB PATIENT 15 MIN: CPT | Performed by: PHYSICIAN ASSISTANT

## 2020-09-03 PROCEDURE — U0003 INFECTIOUS AGENT DETECTION BY NUCLEIC ACID (DNA OR RNA); SEVERE ACUTE RESPIRATORY SYNDROME CORONAVIRUS 2 (SARS-COV-2) (CORONAVIRUS DISEASE [COVID-19]), AMPLIFIED PROBE TECHNIQUE, MAKING USE OF HIGH THROUGHPUT TECHNOLOGIES AS DESCRIBED BY CMS-2020-01-R: HCPCS | Performed by: PHYSICIAN ASSISTANT

## 2020-09-03 RX ORDER — ALBUTEROL SULFATE 90 UG/1
2 AEROSOL, METERED RESPIRATORY (INHALATION) 4 TIMES DAILY
Qty: 8 G | Refills: 0 | Status: SHIPPED | OUTPATIENT
Start: 2020-09-03 | End: 2020-11-20 | Stop reason: ALTCHOICE

## 2020-09-03 NOTE — PROGRESS NOTES
[employer]        NAME: Sukhi San is a 47 y o  female  : 1965    MRN: [id number]  DATE: September 3, 2020  TIME: 10:35 AM    Assessment and Plan   Cough [R05]  1  Cough  albuterol (PROVENTIL HFA,VENTOLIN HFA) 90 mcg/act inhaler    Novel Coronavirus (COVID-19), PCR [facility] - Office Collection         Patient Instructions     Patient Instructions     Benign exam   Pulse ox adequate  Lungs are clear  No fever  Tylenol for headache  We will start with albuterol for cough and chest congestion  She can use Delsym over-the-counter as well  Will check COVID swab since she has a granddaughter on the way        Follow up with PCP in 3-5 days  Proceed to  ER if symptoms worsen  Chief Complaint     Chief Complaint   Patient presents with    Cold Like Symptoms     patient complains of cough, runny nose, sore/scratchy throat, headache and fatigue starting yesterday  History of Present Illness        51-year-old female complains of 2 days of cough chest congestion runny nose and sore throat  No change in taste or smell  No nausea vomiting or diarrhea  No shortness of breath  No sick contacts  She was in [place]  Review of Systems   Review of Systems   Constitutional: Positive for fatigue  Negative for chills and fever  HENT: Positive for sore throat  Negative for congestion, ear pain, postnasal drip, rhinorrhea, sinus pressure, sinus pain and trouble swallowing  Eyes: Negative for visual disturbance  Respiratory: Positive for cough and chest tightness  Negative for shortness of breath  Cardiovascular: Negative for chest pain and palpitations  Gastrointestinal: Negative for diarrhea, nausea and vomiting  Neurological: Negative for dizziness           Current Medications       Current Outpatient Medications:     albuterol (PROVENTIL HFA,VENTOLIN HFA) 90 mcg/act inhaler, Inhale 2 puffs 4 (four) times a day, Disp: 8 g, Rfl: 0    naproxen (EC NAPROSYN) 500 MG EC tablet, Take 1 tablet by mouth 2 (two) times a day with meals for 30 days, Disp: 60 tablet, Rfl: 0    oxyCODONE (ROXICODONE) 5 mg immediate release tablet, 1-2 tabs po q4-6 hr prn pain (Patient not taking: Reported on 9/3/2020), Disp: 30 tablet, Rfl: 0    Current Allergies     Allergies as of 09/03/2020    (No Known Allergies)            The following portions of the patient's history were reviewed and updated as appropriate: allergies, current medications, past family history, past medical history, past social history, past surgical history and problem list      History reviewed  No pertinent past medical history  Past Surgical History:   Procedure Laterality Date    ELBOW FRACTURE REPAIR Left 8/13/2016    Procedure: OPEN REDUCTION W/ INTERNAL FIXATION (ORIF) ELBOW;  Surgeon: Cesar Harper MD;  [facility];  Service:     FINGER DEBRIDEMENT      HAND DEBRIDEMENT Left 2/26/2017    Procedure: Arthrotomy, I/D left elblow;  Surgeon: Diandra Malone MD;  [facility];  Service:     ORIF HUMERUS FRACTURE Left 8/13/2016    Procedure: OPEN REDUCTION W/ INTERNAL FIXATION (ORIF) HUMERUS MIDSHAFT / DISTAL;  Surgeon: Cesar Harper MD;  [facility];  Service:     ORIF WRIST FRACTURE Left 8/13/2016    Procedure: OPEN REDUCTION W/ INTERNAL FIXATION (ORIF) RADIUS / ULNA (WRIST); Surgeon: Cesar Harper MD;  [facility];  Service:    [place] AND ADNOIDECTOMY      WOUND DEBRIDEMENT Left 8/17/2016    Procedure: DEBRIDEMENT UPPER EXTREMITY ([address]) AND WOUND CLOSURE;  Surgeon: Marissa Marmolejo MD;  [facility];  Service:     WOUND DEBRIDEMENT Left 8/15/2016    Procedure: DEBRIDEMENT WOUND AND DRESSING CHANGE ([address] OUT)  Left forearm;  Surgeon: Cesar Harper MD;  [facility];  Service:     WOUND DEBRIDEMENT Left 8/13/2016    Procedure: DEBRIDEMENT WOUND [facility] OUT);   Surgeon: Cesar Harper MD;  [facility];  Service: History reviewed  No pertinent family history  Medications have been verified  Objective   /98   Pulse 99   Temp 97 6 °F (36 4 °C)   Resp 18   Ht 5' 6 5" (1 689 m)   Wt 73 5 kg (162 lb)   SpO2 98%   BMI 25 76 kg/m²        Physical Exam     Physical Exam  Constitutional:       General: She is not in acute distress  Appearance: She is well-developed  HENT:      Head: Normocephalic and atraumatic  Right Ear: Tympanic membrane, ear canal and external ear normal  No middle ear effusion  Tympanic membrane is not erythematous, retracted or bulging  Left Ear: Tympanic membrane, ear canal and external ear normal   No middle ear effusion  Tympanic membrane is not erythematous, retracted or bulging  Nose: Nose normal  No mucosal edema or rhinorrhea  Right Sinus: No maxillary sinus tenderness or frontal sinus tenderness  Left Sinus: No maxillary sinus tenderness or frontal sinus tenderness  Mouth/Throat:      Pharynx: No posterior oropharyngeal erythema  Eyes:      General:         Right eye: No discharge  Left eye: No discharge  Conjunctiva/sclera: Conjunctivae normal       Right eye: Right conjunctiva is not injected  No chemosis  Left eye: Left conjunctiva is not injected  No chemosis  Pupils: Pupils are equal, round, and reactive to light  Neck:      Musculoskeletal: Normal range of motion and neck supple  Cardiovascular:      Rate and Rhythm: Normal rate and regular rhythm  Heart sounds: Normal heart sounds  Pulmonary:      Effort: Pulmonary effort is normal  No respiratory distress  Breath sounds: Normal breath sounds  No wheezing or rales  Lymphadenopathy:      Cervical: No cervical adenopathy  Right cervical: No superficial cervical adenopathy  Left cervical: No superficial cervical adenopathy  Skin:     General: Skin is warm and dry  Findings: No rash     Neurological:      Mental Status: She is alert and oriented to person, place, and time  Cranial Nerves: No cranial nerve deficit

## 2020-09-03 NOTE — PATIENT INSTRUCTIONS
Benign exam   Pulse ox adequate  Lungs are clear  No fever  Tylenol for headache  We will start with albuterol for cough and chest congestion  She can use Delsym over-the-counter as well    Will check COVID swab since she has a granddaughter on the way

## 2020-09-04 LAB — SARS-COV-2 RNA SPEC QL NAA+PROBE: NOT DETECTED

## 2020-11-20 ENCOUNTER — OFFICE VISIT (OUTPATIENT)
Dept: URGENT CARE | Facility: CLINIC | Age: 55
End: 2020-11-20
Payer: COMMERCIAL

## 2020-11-20 VITALS — HEART RATE: 83 BPM | OXYGEN SATURATION: 99 % | TEMPERATURE: 97.3 F | RESPIRATION RATE: 16 BRPM

## 2020-11-20 DIAGNOSIS — Z11.59 SPECIAL SCREENING EXAMINATION FOR VIRAL DISEASE: Primary | ICD-10-CM

## 2020-11-20 PROCEDURE — 99213 OFFICE O/P EST LOW 20 MIN: CPT | Performed by: PHYSICIAN ASSISTANT

## 2020-11-20 PROCEDURE — U0003 INFECTIOUS AGENT DETECTION BY NUCLEIC ACID (DNA OR RNA); SEVERE ACUTE RESPIRATORY SYNDROME CORONAVIRUS 2 (SARS-COV-2) (CORONAVIRUS DISEASE [COVID-19]), AMPLIFIED PROBE TECHNIQUE, MAKING USE OF HIGH THROUGHPUT TECHNOLOGIES AS DESCRIBED BY CMS-2020-01-R: HCPCS | Performed by: PHYSICIAN ASSISTANT

## 2020-11-20 RX ORDER — AZITHROMYCIN 250 MG/1
TABLET, FILM COATED ORAL
Qty: 6 TABLET | Refills: 0 | Status: SHIPPED | OUTPATIENT
Start: 2020-11-20 | End: 2020-11-24

## 2020-11-22 LAB — SARS-COV-2 RNA SPEC QL NAA+PROBE: NOT DETECTED

## 2021-01-14 ENCOUNTER — TELEMEDICINE (OUTPATIENT)
Dept: FAMILY MEDICINE CLINIC | Facility: CLINIC | Age: 56
End: 2021-01-14
Payer: COMMERCIAL

## 2021-01-14 VITALS — WEIGHT: 155 LBS | HEIGHT: 66 IN | BODY MASS INDEX: 24.91 KG/M2

## 2021-01-14 DIAGNOSIS — J06.9 ACUTE URI: Primary | ICD-10-CM

## 2021-01-14 DIAGNOSIS — J06.9 ACUTE URI: ICD-10-CM

## 2021-01-14 PROBLEM — R00.2 PALPITATIONS: Status: ACTIVE | Noted: 2017-01-04

## 2021-01-14 PROBLEM — Z78.0 MENOPAUSE: Status: ACTIVE | Noted: 2017-01-04

## 2021-01-14 PROCEDURE — U0003 INFECTIOUS AGENT DETECTION BY NUCLEIC ACID (DNA OR RNA); SEVERE ACUTE RESPIRATORY SYNDROME CORONAVIRUS 2 (SARS-COV-2) (CORONAVIRUS DISEASE [COVID-19]), AMPLIFIED PROBE TECHNIQUE, MAKING USE OF HIGH THROUGHPUT TECHNOLOGIES AS DESCRIBED BY CMS-2020-01-R: HCPCS | Performed by: NURSE PRACTITIONER

## 2021-01-14 PROCEDURE — 1036F TOBACCO NON-USER: CPT | Performed by: NURSE PRACTITIONER

## 2021-01-14 PROCEDURE — 3725F SCREEN DEPRESSION PERFORMED: CPT | Performed by: NURSE PRACTITIONER

## 2021-01-14 PROCEDURE — 99213 OFFICE O/P EST LOW 20 MIN: CPT | Performed by: NURSE PRACTITIONER

## 2021-01-14 PROCEDURE — 3008F BODY MASS INDEX DOCD: CPT | Performed by: NURSE PRACTITIONER

## 2021-01-14 PROCEDURE — U0005 INFEC AGEN DETEC AMPLI PROBE: HCPCS | Performed by: NURSE PRACTITIONER

## 2021-01-14 NOTE — PROGRESS NOTES
Virtual Regular Visit      Assessment/Plan:    Problem List Items Addressed This Visit     None      Visit Diagnoses     Acute URI    -  Primary    Relevant Orders    Novel Coronavirus (COVID-19), PCR [facility] - Collected at   [facility]      will r/o covid-19 and if symptoms still persist in 7 days then will consider starting antibiotics  Supportive care for viral illness discussed and advised  will follow up for any worsening and no improvement  Supportive care discussed and advised  Advised to RTO for any worsening and no improvement  Follow up for no improvement and worsening of conditions  Patient advised and educated when to see immediate medical care  Reason for visit is   Chief Complaint   Patient presents with    Virtual Regular Visit        Encounter provider FLOYD Menon    Provider located at [address] [address]      Recent Visits  No visits were found meeting these conditions  Showing recent visits within past 7 days and meeting all other requirements     Today's Visits  Date Type Provider Dept   01/14/21 Telemedicine Elie Menon today's visits and meeting all other requirements     Future Appointments  No visits were found meeting these conditions  Showing future appointments within next 150 days and meeting all other requirements        The patient was identified by name and date of birth  Alexandra Pura was informed that this is a telemedicine visit and that the visit is being conducted through [facility] and patient was informed that this is not a secure, HIPAA-compliant platform  She agrees to proceed     My office door was closed  No one else was in the room  She acknowledged consent and understanding of privacy and security of the video platform  The patient has agreed to participate and understands they can discontinue the visit at any time      Patient is aware this is a billable service  Subjective  Gaby Reyes is a 54 y o  female    Body mass index is 25 02 kg/m²  HPI   New to practice  Personal and family medical history reviewed  Denies any family h/o colon and breast cancer  Never had the colonoscopy and last mammogram couple of years ago  Patient stated that started with sore throat and progressed to cough  Denies any fever, chills, sob,  running nose, fatigue, headache, new loss of sense of smell and taste,  congestion,  nausea, vomiting and diarrhea  Patient is working from home  Taking OTC Cold medication and tylenol  Denies any known exposure to COVID-19 positive or presumed patient  History reviewed  No pertinent past medical history  Past Surgical History:   Procedure Laterality Date    ELBOW FRACTURE REPAIR Left 8/13/2016    Procedure: OPEN REDUCTION W/ INTERNAL FIXATION (ORIF) ELBOW;  Surgeon: Sravanthi Jacobsen MD;  [facility];  Service:     FINGER DEBRIDEMENT      HAND DEBRIDEMENT Left 2/26/2017    Procedure: Arthrotomy, I/D left elblow;  Surgeon: Anna Bender MD;  [facility];  Service:     ORIF HUMERUS FRACTURE Left 8/13/2016    Procedure: OPEN REDUCTION W/ INTERNAL FIXATION (ORIF) HUMERUS MIDSHAFT / DISTAL;  Surgeon: Sravanthi Jacobsen MD;  [facility];  Service:     ORIF WRIST FRACTURE Left 8/13/2016    Procedure: OPEN REDUCTION W/ INTERNAL FIXATION (ORIF) RADIUS / ULNA (WRIST); Surgeon: Sravanthi Jacobsen MD;  [facility];  Service:    Fleeta Garner AND ADNOIDECTOMY      WOUND DEBRIDEMENT Left 8/17/2016    Procedure: DEBRIDEMENT UPPER EXTREMITY ([address]) AND WOUND CLOSURE;  Surgeon: Janet Stanford MD;  [facility];  Service:     WOUND DEBRIDEMENT Left 8/15/2016    Procedure: DEBRIDEMENT WOUND AND DRESSING CHANGE ([address] OUT)   Left forearm;  Surgeon: Sravanthi Jacobsen MD;  [facility];  Service:     WOUND DEBRIDEMENT Left 8/13/2016    Procedure: DEBRIDEMENT WOUND [facility] OUT); Surgeon: Brian Bianchi MD;  [facility];  Service:        No current outpatient medications on file  No current facility-administered medications for this visit  No Known Allergies    Review of Systems   Constitutional: Negative for chills, diaphoresis, fatigue, fever and unexpected weight change  HENT: Positive for sore throat  Negative for congestion, dental problem, drooling, ear discharge, ear pain, facial swelling, hearing loss, mouth sores, nosebleeds, postnasal drip, rhinorrhea, sinus pressure, sinus pain, sneezing, tinnitus, trouble swallowing and voice change  Respiratory: Positive for cough  Negative for chest tightness, shortness of breath and wheezing  Cardiovascular: Negative  Gastrointestinal: Negative for abdominal pain, constipation, diarrhea, nausea and vomiting  Musculoskeletal: Negative  Skin: Negative  Neurological: Negative for dizziness, light-headedness and headaches  Hematological: Negative  Video Exam    Vitals:    01/14/21 1100   Weight: 70 3 kg (155 lb)   Height: 5' 6" (1 676 m)       Physical Exam  Constitutional:       Appearance: She is well-developed  HENT:      Nose: Nose normal    Eyes:      Conjunctiva/sclera: Conjunctivae normal    Neck:      Musculoskeletal: Normal range of motion  Pulmonary:      Effort: Pulmonary effort is normal       Comments: No cough and distress noted on video call  Skin:     Comments: No diaphoresis noted on video call   Neurological:      Mental Status: She is alert and oriented to person, place, and time  Psychiatric:         Mood and Affect: Mood normal          Behavior: Behavior normal          Thought Content: Thought content normal          Judgment: Judgment normal           I spent 11 minutes directly with the patient during this visit      VIRTUAL VISIT DISCLAIMER    Mary Walker acknowledges that she has consented to an online visit or consultation   She understands that the online visit is based solely on information provided by her, and that, in the absence of a face-to-face physical evaluation by the physician, the diagnosis she receives is both limited and provisional in terms of accuracy and completeness  This is not intended to replace a full medical face-to-face evaluation by the physician  Neftali Gonzales understands and accepts these terms

## 2021-01-15 LAB — SARS-COV-2 RNA SPEC QL NAA+PROBE: NOT DETECTED

## 2021-02-11 NOTE — MISCELLANEOUS
Message  Return to work or school:   Zach Arredondo is under my professional care  She was seen in my office on March 9, 2017       May start work on March 13, 2017  Thank you          Signatures   Electronically signed by : WARD Rosales ; Mar  9 2017  3:43PM EST                       (Author) no obvious rashes

## 2021-04-08 DIAGNOSIS — Z23 ENCOUNTER FOR IMMUNIZATION: ICD-10-CM

## 2021-04-27 ENCOUNTER — OFFICE VISIT (OUTPATIENT)
Dept: FAMILY MEDICINE CLINIC | Facility: CLINIC | Age: 56
End: 2021-04-27
Payer: COMMERCIAL

## 2021-04-27 VITALS
SYSTOLIC BLOOD PRESSURE: 130 MMHG | TEMPERATURE: 98.4 F | WEIGHT: 146 LBS | RESPIRATION RATE: 16 BRPM | HEART RATE: 78 BPM | BODY MASS INDEX: 23.46 KG/M2 | DIASTOLIC BLOOD PRESSURE: 80 MMHG | HEIGHT: 66 IN

## 2021-04-27 DIAGNOSIS — Z23 NEED FOR VACCINATION: ICD-10-CM

## 2021-04-27 DIAGNOSIS — Z12.11 COLON CANCER SCREENING: ICD-10-CM

## 2021-04-27 DIAGNOSIS — Z00.00 ANNUAL PHYSICAL EXAM: Primary | ICD-10-CM

## 2021-04-27 DIAGNOSIS — Z13.0 SCREENING FOR DEFICIENCY ANEMIA: ICD-10-CM

## 2021-04-27 DIAGNOSIS — Z12.31 ENCOUNTER FOR SCREENING MAMMOGRAM FOR BREAST CANCER: ICD-10-CM

## 2021-04-27 DIAGNOSIS — Z12.4 CERVICAL CANCER SCREENING: ICD-10-CM

## 2021-04-27 DIAGNOSIS — Z13.6 SCREENING FOR CARDIOVASCULAR CONDITION: ICD-10-CM

## 2021-04-27 LAB — SL AMB POCT FECES OCC BLD: NEGATIVE

## 2021-04-27 PROCEDURE — 82270 OCCULT BLOOD FECES: CPT | Performed by: FAMILY MEDICINE

## 2021-04-27 PROCEDURE — 99396 PREV VISIT EST AGE 40-64: CPT | Performed by: FAMILY MEDICINE

## 2021-04-27 PROCEDURE — 1036F TOBACCO NON-USER: CPT | Performed by: FAMILY MEDICINE

## 2021-04-27 PROCEDURE — 3008F BODY MASS INDEX DOCD: CPT | Performed by: FAMILY MEDICINE

## 2021-04-27 PROCEDURE — 3725F SCREEN DEPRESSION PERFORMED: CPT | Performed by: FAMILY MEDICINE

## 2021-04-27 PROCEDURE — 90750 HZV VACC RECOMBINANT IM: CPT

## 2021-04-27 PROCEDURE — 90471 IMMUNIZATION ADMIN: CPT

## 2021-04-27 NOTE — PROGRESS NOTES
FAMILY PRACTICE HEALTH MAINTENANCE OFFICE VISIT  [facility] - [address]    NAME: Daljit Crane  AGE: 54 y o  SEX: female  : 1965     DATE: 2021    Assessment and Plan     1  Annual physical exam    2  Encounter for screening mammogram for breast cancer  -     Mammo screening bilateral w 3d & cad; Future; Expected date: 2021    3  Colon cancer screening  -     Cologuard; Future  -     POCT hemoccult screening    4  Need for vaccination  -     Zoster Vaccine Recombinant IM    5  Cervical cancer screening  -     IGP, Aptima HPV    6  Screening for cardiovascular condition  -     Comprehensive metabolic panel; Future  -     Lipid Panel with Direct LDL reflex; Future    7  Screening for deficiency anemia  -     CBC; Future        · Patient Counseling:   · Nutrition: Stressed importance of a well balanced diet, moderation of sodium/saturated fat, caloric balance and sufficient intake of fiber  · Exercise: Stressed the importance of regular exercise with a goal of 150 minutes per week  · Dental Health: Discussed daily flossing and brushing and regular dental visits     · Immunizations reviewed: See Orders  · Discussed benefits of:  Colon Cancer Screening, Mammogram , Cervical Cancer screening and Screening labs   BMI Counseling: Body mass index is 23 57 kg/m²  Discussed with patient's BMI with her  Return in about 1 year (around 2022) for Annual physical and needs 2nd Shingrix in 2 months   Chief Complaint     Chief Complaint   Patient presents with    Well Check     CPE, sslpn       History of Present Illness     She gained weight during COVID  She has started Weight Watchers and has lost weight         Well Adult Physical   Patient here for a comprehensive physical exam       Diet and Physical Activity  Diet: well balanced diet  Exercise: frequently      Depression Screen  PHQ-9 Depression Screening    PHQ-9:   Frequency of the following problems over the past two weeks:      Little interest or pleasure in doing things: 0 - not at all  Feeling down, depressed, or hopeless: 0 - not at all  PHQ-2 Score: 0          General Health  Hearing: Normal:  bilateral  Vision: wears glasses  Dental: regular dental visits    Reproductive Health  Post-menopausal       The following portions of the patient's history were reviewed and updated as appropriate: allergies, current medications, past family history, past medical history, past social history, past surgical history and problem list     Review of Systems     Review of Systems   Respiratory: Negative  Cardiovascular: Negative  Past Medical History     History reviewed  No pertinent past medical history  Past Surgical History     Past Surgical History:   Procedure Laterality Date    ELBOW FRACTURE REPAIR Left 8/13/2016    Procedure: OPEN REDUCTION W/ INTERNAL FIXATION (ORIF) ELBOW;  Surgeon: Lucille Goddard MD;  [facility];  Service:     FINGER DEBRIDEMENT      HAND DEBRIDEMENT Left 2/26/2017    Procedure: Arthrotomy, I/D left elblow;  Surgeon: Len Louis MD;  [facility];  Service:     ORIF HUMERUS FRACTURE Left 8/13/2016    Procedure: OPEN REDUCTION W/ INTERNAL FIXATION (ORIF) HUMERUS MIDSHAFT / DISTAL;  Surgeon: Lucille Goddard MD;  [facility];  Service:     ORIF WRIST FRACTURE Left 8/13/2016    Procedure: OPEN REDUCTION W/ INTERNAL FIXATION (ORIF) RADIUS / ULNA (WRIST); Surgeon: Lucille Goddard MD;  [facility];  Service:    Raquel Lava AND ADNOIDECTOMY      WOUND DEBRIDEMENT Left 8/17/2016    Procedure: DEBRIDEMENT UPPER EXTREMITY ([address]) AND WOUND CLOSURE;  Surgeon: Samara Garza MD;  [facility];  Service:     WOUND DEBRIDEMENT Left 8/15/2016    Procedure: DEBRIDEMENT WOUND AND DRESSING CHANGE ([address])   Left forearm;  Surgeon: Lucille Goddard MD;  [facility];  Service:     WOUND DEBRIDEMENT Left 8/13/2016 Procedure: DEBRIDEMENT WOUND [facility] OUT); Surgeon: Columba Antoine MD;  [facility];  Service:        Social History     Social History     Socioeconomic History    Marital status: /Civil Union     Spouse name: None    Number of children: None    Years of education: None    Highest education level: None   Occupational History    None   Social Needs    Financial resource strain: None    Food insecurity     Worry: None     Inability: None    Transportation needs     Medical: None     Non-medical: None   Tobacco Use    Smoking status: Former Smoker    Smokeless tobacco: Never Used   Substance and Sexual Activity    Alcohol use: Yes     Comment: social    Drug use: No    Sexual activity: Yes   Lifestyle    Physical activity     Days per week: None     Minutes per session: None    Stress: None   Relationships    Social connections     Talks on phone: None     Gets together: None     Attends Yazdanism service: None     Active member of club or organization: None     Attends meetings of clubs or organizations: None     Relationship status: None    Intimate partner violence     Fear of current or ex partner: None     Emotionally abused: None     Physically abused: None     Forced sexual activity: None   Other Topics Concern    None   Social History Narrative    None       Family History     Family History   Problem Relation Age of Onset    Autoimmune disease Mother     Stomach cancer Father     Liver disease Father     Diabetes Father     Lupus Sister     Diabetes Brother     Rheum arthritis Sister     Bipolar disorder Sister        Current Medications     No current outpatient medications on file  Allergies     No Known Allergies    Objective     /80   Pulse 78   Temp 98 4 °F (36 9 °C)   Resp 16   Ht 5' 6" (1 676 m)   Wt 66 2 kg (146 lb)   BMI 23 57 kg/m²      Physical Exam  Vitals signs and nursing note reviewed  Exam conducted with a chaperone present  Constitutional:       Appearance: She is well-developed  HENT:      Head: Normocephalic and atraumatic  Right Ear: Tympanic membrane and external ear normal       Left Ear: Tympanic membrane and external ear normal    Neck:      Musculoskeletal: Normal range of motion  Cardiovascular:      Rate and Rhythm: Normal rate and regular rhythm  Heart sounds: Normal heart sounds  No murmur  Pulmonary:      Effort: Pulmonary effort is normal  No respiratory distress  Breath sounds: Normal breath sounds  No wheezing or rales  Abdominal:      General: Bowel sounds are normal  There is no distension  Palpations: Abdomen is soft  Tenderness: There is no abdominal tenderness  There is no rebound  Genitourinary:     Labia:         Right: No rash, tenderness or lesion  Left: No rash, tenderness or lesion  Vagina: Normal  No vaginal discharge or tenderness  Cervix: Normal       Uterus: Normal        Adnexa: Right adnexa normal and left adnexa normal       Rectum: Normal  Guaiac result negative  No tenderness  Comments: Breast exam:   No masses, no skin changes, no tenderness bilaterally  Musculoskeletal:      Right lower leg: No edema  Left lower leg: No edema              Visual Acuity Screening    Right eye Left eye Both eyes   Without correction:      With correction: 20/15 20/15 [address], [address]

## 2021-05-02 LAB
CYTOLOGIST CVX/VAG CYTO: NORMAL
DX ICD CODE: NORMAL
HPV I/H RISK 4 DNA CVX QL PROBE+SIG AMP: NEGATIVE
OTHER STN SPEC: NORMAL
PATH REPORT.FINAL DX SPEC: NORMAL
SL AMB NOTE:: NORMAL
SL AMB SPECIMEN ADEQUACY: NORMAL
SL AMB TEST METHODOLOGY: NORMAL

## 2021-05-04 LAB
ALBUMIN SERPL-MCNC: 4.8 G/DL (ref 3.8–4.9)
ALBUMIN/GLOB SERPL: 1.7 {RATIO} (ref 1.2–2.2)
ALP SERPL-CCNC: 85 IU/L (ref 39–117)
ALT SERPL-CCNC: 16 IU/L (ref 0–32)
AST SERPL-CCNC: 24 IU/L (ref 0–40)
BASOPHILS # BLD AUTO: 0 X10E3/UL (ref 0–0.2)
BASOPHILS NFR BLD AUTO: 1 %
BILIRUB SERPL-MCNC: 0.6 MG/DL (ref 0–1.2)
BUN SERPL-MCNC: 12 MG/DL (ref 6–24)
BUN/CREAT SERPL: 15 (ref 9–23)
CALCIUM SERPL-MCNC: 10.2 MG/DL (ref 8.7–10.2)
CHLORIDE SERPL-SCNC: 99 MMOL/L (ref 96–106)
CHOLEST SERPL-MCNC: 196 MG/DL (ref 100–199)
CO2 SERPL-SCNC: 28 MMOL/L (ref 20–29)
CREAT SERPL-MCNC: 0.78 MG/DL (ref 0.57–1)
EOSINOPHIL # BLD AUTO: 0.2 X10E3/UL (ref 0–0.4)
EOSINOPHIL NFR BLD AUTO: 5 %
ERYTHROCYTE [DISTWIDTH] IN BLOOD BY AUTOMATED COUNT: 12.1 % (ref 11.7–15.4)
GLOBULIN SER-MCNC: 2.8 G/DL (ref 1.5–4.5)
GLUCOSE SERPL-MCNC: 107 MG/DL (ref 65–99)
HCT VFR BLD AUTO: 44.3 % (ref 34–46.6)
HDLC SERPL-MCNC: 63 MG/DL
HGB BLD-MCNC: 14.8 G/DL (ref 11.1–15.9)
IMM GRANULOCYTES # BLD: 0 X10E3/UL (ref 0–0.1)
IMM GRANULOCYTES NFR BLD: 0 %
LDLC SERPL CALC-MCNC: 117 MG/DL (ref 0–99)
LYMPHOCYTES # BLD AUTO: 1.5 X10E3/UL (ref 0.7–3.1)
LYMPHOCYTES NFR BLD AUTO: 34 %
MCH RBC QN AUTO: 29.6 PG (ref 26.6–33)
MCHC RBC AUTO-ENTMCNC: 33.4 G/DL (ref 31.5–35.7)
MCV RBC AUTO: 89 FL (ref 79–97)
MICRODELETION SYND BLD/T FISH: NORMAL
MONOCYTES # BLD AUTO: 0.4 X10E3/UL (ref 0.1–0.9)
MONOCYTES NFR BLD AUTO: 10 %
NEUTROPHILS # BLD AUTO: 2.2 X10E3/UL (ref 1.4–7)
NEUTROPHILS NFR BLD AUTO: 50 %
PLATELET # BLD AUTO: 240 X10E3/UL (ref 150–450)
POTASSIUM SERPL-SCNC: 3.9 MMOL/L (ref 3.5–5.2)
PROT SERPL-MCNC: 7.6 G/DL (ref 6–8.5)
RBC # BLD AUTO: 5 X10E6/UL (ref 3.77–5.28)
SL AMB EGFR AFRICAN AMERICAN: 99 ML/MIN/1.73
SL AMB EGFR NON AFRICAN AMERICAN: 86 ML/MIN/1.73
SODIUM SERPL-SCNC: 138 MMOL/L (ref 134–144)
TRIGL SERPL-MCNC: 86 MG/DL (ref 0–149)
WBC # BLD AUTO: 4.5 X10E3/UL (ref 3.4–10.8)

## 2021-05-18 ENCOUNTER — HOSPITAL ENCOUNTER (OUTPATIENT)
Dept: RADIOLOGY | Facility: HOSPITAL | Age: 56
Discharge: HOME | End: 2021-05-18
Payer: COMMERCIAL

## 2021-05-18 VITALS — WEIGHT: 146 LBS | HEIGHT: 66 IN | BODY MASS INDEX: 23.46 KG/M2

## 2021-05-18 DIAGNOSIS — Z12.31 ENCOUNTER FOR SCREENING MAMMOGRAM FOR BREAST CANCER: ICD-10-CM

## 2021-05-18 PROCEDURE — 77067 SCR MAMMO BI INCL CAD: CPT

## 2021-05-18 PROCEDURE — 77063 BREAST TOMOSYNTHESIS BI: CPT

## 2021-06-25 ENCOUNTER — TELEPHONE (OUTPATIENT)
Dept: FAMILY MEDICINE CLINIC | Facility: CLINIC | Age: 56
End: 2021-06-25

## 2021-06-28 ENCOUNTER — CLINICAL SUPPORT (OUTPATIENT)
Dept: FAMILY MEDICINE CLINIC | Facility: CLINIC | Age: 56
End: 2021-06-28
Payer: COMMERCIAL

## 2021-06-28 DIAGNOSIS — Z23 NEED FOR VACCINATION: Primary | ICD-10-CM

## 2021-06-28 PROCEDURE — 90750 HZV VACC RECOMBINANT IM: CPT

## 2021-06-28 PROCEDURE — 90471 IMMUNIZATION ADMIN: CPT

## 2021-07-24 ENCOUNTER — OFFICE VISIT (OUTPATIENT)
Dept: URGENT CARE | Facility: CLINIC | Age: 56
End: 2021-07-24
Payer: COMMERCIAL

## 2021-07-24 VITALS
SYSTOLIC BLOOD PRESSURE: 152 MMHG | RESPIRATION RATE: 18 BRPM | BODY MASS INDEX: 22.32 KG/M2 | OXYGEN SATURATION: 99 % | DIASTOLIC BLOOD PRESSURE: 98 MMHG | HEART RATE: 83 BPM | TEMPERATURE: 98.9 F | WEIGHT: 142.2 LBS | HEIGHT: 67 IN

## 2021-07-24 DIAGNOSIS — J02.9 ACUTE VIRAL PHARYNGITIS: Primary | ICD-10-CM

## 2021-07-24 PROCEDURE — 99213 OFFICE O/P EST LOW 20 MIN: CPT | Performed by: PHYSICIAN ASSISTANT

## 2021-07-24 NOTE — PATIENT INSTRUCTIONS
Symptoms consistent with a viral upper respiratory infection  Can treat with over the counter medications such as Mucinex-D, which has sudafed in it, ibuporfen/tylenol for any pain or fevers  If symptoms persist follow up with primary care doctor  If symptoms worsen proceed to the ER

## 2021-07-27 NOTE — PROGRESS NOTES
[address]        NAME: Boubacar Montaño is a 54 y o  female  : 1965    MRN: [id number]  DATE: 2021  TIME: 9:04 AM    Assessment and Plan   Acute viral pharyngitis [J02 9]  1  Acute viral pharyngitis           Patient Instructions     Patient Instructions   Symptoms consistent with a viral upper respiratory infection  Can treat with over the counter medications such as Mucinex-D, which has sudafed in it, ibuporfen/tylenol for any pain or fevers  If symptoms persist follow up with primary care doctor  If symptoms worsen proceed to the ER  Follow up with PCP in 3-5 days  Proceed to  ER if symptoms worsen  Chief Complaint     Chief Complaint   Patient presents with    Sore Throat     cold symptoms sore throat since Tuesday          History of Present Illness       55 y/o female presents with a sore throat, cough, congestion x2 days  She has been using mucinex-dm with out much relief  Pt denies fever, chills, body aches, fatigue, nausea, vomiting, diarrhea, SOB or CP  No known COVID exposures  Review of Systems   Review of Systems   Constitutional: Negative for chills and fever  HENT: Positive for congestion, postnasal drip, rhinorrhea and sore throat  Negative for sinus pressure and sinus pain  Respiratory: Negative for cough, chest tightness and shortness of breath  Cardiovascular: Negative for chest pain and palpitations  Gastrointestinal: Negative for abdominal pain, diarrhea, nausea and vomiting  Neurological: Negative for dizziness and headaches  Current Medications     No current outpatient medications on file      Current Allergies     Allergies as of 2021    (No Known Allergies)            The following portions of the patient's history were reviewed and updated as appropriate: allergies, current medications, past family history, past medical history, past social history, past surgical history and problem list      No past medical history on file     Past Surgical History:   Procedure Laterality Date    ELBOW FRACTURE REPAIR Left 8/13/2016    Procedure: OPEN REDUCTION W/ INTERNAL FIXATION (ORIF) ELBOW;  Surgeon: Dev Sanford MD;  [facility];  Service:     FINGER DEBRIDEMENT      HAND DEBRIDEMENT Left 2/26/2017    Procedure: Arthrotomy, I/D left elblow;  Surgeon: Michelle Glasgow MD;  [facility];  Service:     ORIF HUMERUS FRACTURE Left 8/13/2016    Procedure: OPEN REDUCTION W/ INTERNAL FIXATION (ORIF) HUMERUS MIDSHAFT / DISTAL;  Surgeon: Dev Sanford MD;  [facility];  Service:     ORIF WRIST FRACTURE Left 8/13/2016    Procedure: OPEN REDUCTION W/ INTERNAL FIXATION (ORIF) RADIUS / ULNA (WRIST); Surgeon: Dev Sanford MD;  [facility];  Service:    Carrolyn Lager AND ADNOIDECTOMY      WOUND DEBRIDEMENT Left 8/17/2016    Procedure: DEBRIDEMENT UPPER EXTREMITY ([address]) AND WOUND CLOSURE;  Surgeon: Lidya Batres MD;  [facility];  Service:     WOUND DEBRIDEMENT Left 8/15/2016    Procedure: DEBRIDEMENT WOUND AND DRESSING CHANGE ([address])  Left forearm;  Surgeon: Dev Sanford MD;  [facility];  Service:     WOUND DEBRIDEMENT Left 8/13/2016    Procedure: DEBRIDEMENT WOUND [facility] OUT); Surgeon: Dev Sanford MD;  [facility];  Service:        Family History   Problem Relation Age of Onset    Autoimmune disease Mother     Stomach cancer Father     Liver disease Father     Diabetes Father     Lupus Sister     Diabetes Brother     Rheum arthritis Sister     Bipolar disorder Sister     Breast cancer Paternal Grandmother [de-identified]         Medications have been verified  Objective   /98   Pulse 83   Temp 98 9 °F (37 2 °C)   Resp 18   Ht 5' 7" (1 702 m)   Wt 64 5 kg (142 lb 3 2 oz)   SpO2 99%   BMI 22 27 kg/m²        Physical Exam     Physical Exam  Vitals and nursing note reviewed  Constitutional:       Appearance: Normal appearance  HENT:      Head: Normocephalic  Right Ear: Tympanic membrane normal       Left Ear: Tympanic membrane normal       Nose: Nose normal  No congestion or rhinorrhea  Right Sinus: No frontal sinus tenderness  Left Sinus: No frontal sinus tenderness  Mouth/Throat:      Mouth: Mucous membranes are moist       Pharynx: Oropharynx is clear  Posterior oropharyngeal erythema present  No oropharyngeal exudate  Tonsils: No tonsillar exudate  Eyes:      Conjunctiva/sclera: Conjunctivae normal       Pupils: Pupils are equal, round, and reactive to light  Cardiovascular:      Rate and Rhythm: Normal rate and regular rhythm  Pulses: Normal pulses  Heart sounds: Normal heart sounds  No murmur heard  Pulmonary:      Effort: Pulmonary effort is normal  No respiratory distress  Breath sounds: Normal breath sounds  Abdominal:      Palpations: Abdomen is soft  Tenderness: There is no abdominal tenderness  Musculoskeletal:         General: Normal range of motion  Cervical back: Normal range of motion  No rigidity  Skin:     General: Skin is warm and dry  Neurological:      Mental Status: She is alert and oriented to person, place, and time     Psychiatric:         Behavior: Behavior normal

## 2021-11-02 ENCOUNTER — OFFICE VISIT (OUTPATIENT)
Dept: FAMILY MEDICINE CLINIC | Facility: CLINIC | Age: 56
End: 2021-11-02
Payer: COMMERCIAL

## 2021-11-02 VITALS
RESPIRATION RATE: 18 BRPM | DIASTOLIC BLOOD PRESSURE: 78 MMHG | SYSTOLIC BLOOD PRESSURE: 122 MMHG | BODY MASS INDEX: 22.6 KG/M2 | OXYGEN SATURATION: 98 % | HEART RATE: 90 BPM | WEIGHT: 144 LBS | TEMPERATURE: 98.4 F | HEIGHT: 67 IN

## 2021-11-02 DIAGNOSIS — B34.9 VIRAL INFECTION, UNSPECIFIED: ICD-10-CM

## 2021-11-02 DIAGNOSIS — J02.9 SORE THROAT: Primary | ICD-10-CM

## 2021-11-02 LAB — S PYO AG THROAT QL: NEGATIVE

## 2021-11-02 PROCEDURE — U0005 INFEC AGEN DETEC AMPLI PROBE: HCPCS | Performed by: FAMILY MEDICINE

## 2021-11-02 PROCEDURE — 87880 STREP A ASSAY W/OPTIC: CPT | Performed by: FAMILY MEDICINE

## 2021-11-02 PROCEDURE — U0003 INFECTIOUS AGENT DETECTION BY NUCLEIC ACID (DNA OR RNA); SEVERE ACUTE RESPIRATORY SYNDROME CORONAVIRUS 2 (SARS-COV-2) (CORONAVIRUS DISEASE [COVID-19]), AMPLIFIED PROBE TECHNIQUE, MAKING USE OF HIGH THROUGHPUT TECHNOLOGIES AS DESCRIBED BY CMS-2020-01-R: HCPCS | Performed by: FAMILY MEDICINE

## 2021-11-02 PROCEDURE — 99213 OFFICE O/P EST LOW 20 MIN: CPT | Performed by: FAMILY MEDICINE

## 2021-11-02 RX ORDER — AZITHROMYCIN 250 MG/1
TABLET, FILM COATED ORAL
Qty: 6 TABLET | Refills: 0 | Status: SHIPPED | OUTPATIENT
Start: 2021-11-02 | End: 2021-11-07

## 2021-11-03 ENCOUNTER — TELEPHONE (OUTPATIENT)
Dept: FAMILY MEDICINE CLINIC | Facility: CLINIC | Age: 56
End: 2021-11-03

## 2021-11-03 LAB — SARS-COV-2 RNA RESP QL NAA+PROBE: NEGATIVE

## 2021-11-29 ENCOUNTER — OFFICE VISIT (OUTPATIENT)
Dept: FAMILY MEDICINE CLINIC | Facility: CLINIC | Age: 56
End: 2021-11-29
Payer: COMMERCIAL

## 2021-11-29 VITALS
SYSTOLIC BLOOD PRESSURE: 140 MMHG | WEIGHT: 145 LBS | DIASTOLIC BLOOD PRESSURE: 88 MMHG | RESPIRATION RATE: 18 BRPM | HEART RATE: 74 BPM | OXYGEN SATURATION: 98 % | BODY MASS INDEX: 22.76 KG/M2 | HEIGHT: 67 IN | TEMPERATURE: 98.4 F

## 2021-11-29 DIAGNOSIS — J11.1 INFLUENZA: ICD-10-CM

## 2021-11-29 DIAGNOSIS — B34.9 VIRAL INFECTION, UNSPECIFIED: ICD-10-CM

## 2021-11-29 PROCEDURE — 99213 OFFICE O/P EST LOW 20 MIN: CPT | Performed by: FAMILY MEDICINE

## 2021-11-29 PROCEDURE — 1036F TOBACCO NON-USER: CPT | Performed by: FAMILY MEDICINE

## 2021-11-29 PROCEDURE — 0241U HB NFCT DS VIR RESP RNA 4 TRGT: CPT | Performed by: FAMILY MEDICINE

## 2021-11-29 PROCEDURE — 3008F BODY MASS INDEX DOCD: CPT | Performed by: FAMILY MEDICINE

## 2021-11-29 RX ORDER — OSELTAMIVIR PHOSPHATE 75 MG/1
75 CAPSULE ORAL EVERY 12 HOURS SCHEDULED
Qty: 10 CAPSULE | Refills: 0 | Status: SHIPPED | OUTPATIENT
Start: 2021-11-29 | End: 2021-12-04

## 2021-12-01 ENCOUNTER — TELEPHONE (OUTPATIENT)
Dept: FAMILY MEDICINE CLINIC | Facility: CLINIC | Age: 56
End: 2021-12-01

## 2021-12-01 LAB
FLUAV RNA RESP QL NAA+PROBE: NEGATIVE
FLUBV RNA RESP QL NAA+PROBE: NEGATIVE
RSV RNA RESP QL NAA+PROBE: NEGATIVE
SARS-COV-2 RNA RESP QL NAA+PROBE: NEGATIVE

## 2022-05-02 ENCOUNTER — OFFICE VISIT (OUTPATIENT)
Dept: FAMILY MEDICINE CLINIC | Facility: CLINIC | Age: 57
End: 2022-05-02
Payer: COMMERCIAL

## 2022-05-02 VITALS
WEIGHT: 142 LBS | SYSTOLIC BLOOD PRESSURE: 134 MMHG | RESPIRATION RATE: 16 BRPM | TEMPERATURE: 99.3 F | DIASTOLIC BLOOD PRESSURE: 80 MMHG | HEART RATE: 100 BPM | HEIGHT: 67 IN | BODY MASS INDEX: 22.29 KG/M2

## 2022-05-02 DIAGNOSIS — B34.9 VIRAL INFECTION, UNSPECIFIED: Primary | ICD-10-CM

## 2022-05-02 DIAGNOSIS — J01.00 ACUTE MAXILLARY SINUSITIS, RECURRENCE NOT SPECIFIED: ICD-10-CM

## 2022-05-02 PROCEDURE — 99213 OFFICE O/P EST LOW 20 MIN: CPT | Performed by: FAMILY MEDICINE

## 2022-05-02 PROCEDURE — U0003 INFECTIOUS AGENT DETECTION BY NUCLEIC ACID (DNA OR RNA); SEVERE ACUTE RESPIRATORY SYNDROME CORONAVIRUS 2 (SARS-COV-2) (CORONAVIRUS DISEASE [COVID-19]), AMPLIFIED PROBE TECHNIQUE, MAKING USE OF HIGH THROUGHPUT TECHNOLOGIES AS DESCRIBED BY CMS-2020-01-R: HCPCS | Performed by: FAMILY MEDICINE

## 2022-05-02 PROCEDURE — 3008F BODY MASS INDEX DOCD: CPT | Performed by: FAMILY MEDICINE

## 2022-05-02 PROCEDURE — 3725F SCREEN DEPRESSION PERFORMED: CPT | Performed by: FAMILY MEDICINE

## 2022-05-02 PROCEDURE — U0005 INFEC AGEN DETEC AMPLI PROBE: HCPCS | Performed by: FAMILY MEDICINE

## 2022-05-02 RX ORDER — AZITHROMYCIN 250 MG/1
TABLET, FILM COATED ORAL
Qty: 6 TABLET | Refills: 0 | Status: SHIPPED | OUTPATIENT
Start: 2022-05-02 | End: 2022-05-07

## 2022-05-02 NOTE — PROGRESS NOTES
Assessment/Plan:    1  Viral infection, unspecified  -     COVID Only - Office Collect    2  Acute maxillary sinusitis, recurrence not specified  -     azithromycin (ZITHROMAX) 250 mg tablet; Take 2 the first day, then take 1 daily         There are no Patient Instructions on file for this visit  Return if symptoms worsen or fail to improve  Subjective:      Patient ID: Natividad Saldivar is a 64 y o  female  Chief Complaint   Patient presents with    Sore Throat     started Thurdsay ac/lpn    Cough     with some phlegm    Fatigue    Diarrhea    Nausea     vomiting this morning from phlegm       She started to get under the weather on 4/28/22  She had been around the grand daughter the weekend before  Her home COVID test was negative this morning  Sore Throat   This is a new problem  The current episode started in the past 7 days  The problem has been rapidly worsening  Neither side of throat is experiencing more pain than the other  The pain is at a severity of 4/10  Associated symptoms include congestion, coughing, diarrhea and a hoarse voice  Pertinent negatives include no abdominal pain, drooling, ear discharge, ear pain, headaches, plugged ear sensation, neck pain, shortness of breath, stridor, trouble swallowing or vomiting  The following portions of the patient's history were reviewed and updated as appropriate:  past social history    Review of Systems   HENT: Positive for congestion, hoarse voice and sore throat  Negative for drooling, ear discharge, ear pain and trouble swallowing  Respiratory: Positive for cough  Negative for shortness of breath and stridor  Gastrointestinal: Positive for diarrhea  Negative for abdominal pain and vomiting  Musculoskeletal: Negative for neck pain  Neurological: Negative for headaches           Current Outpatient Medications   Medication Sig Dispense Refill    azithromycin (ZITHROMAX) 250 mg tablet Take 2 the first day, then take 1 daily 6 tablet 0     No current facility-administered medications for this visit  Objective:    /80   Pulse 100   Temp 99 3 °F (37 4 °C)   Resp 16   Ht 5' 7" (1 702 m)   Wt 64 4 kg (142 lb)   BMI 22 24 kg/m²      Physical Exam  Vitals and nursing note reviewed  Constitutional:       Appearance: She is well-developed  HENT:      Head: Normocephalic and atraumatic  Right Ear: Tympanic membrane and external ear normal       Left Ear: Tympanic membrane and external ear normal    Cardiovascular:      Rate and Rhythm: Normal rate and regular rhythm  Heart sounds: Normal heart sounds  No murmur heard  No friction rub  Pulmonary:      Effort: Pulmonary effort is normal  No respiratory distress  Breath sounds: Normal breath sounds  No wheezing or rales  Musculoskeletal:      Right lower leg: No edema  Left lower leg: No edema               Nelda Ruano, DO

## 2022-05-04 LAB — SARS-COV-2 RNA RESP QL NAA+PROBE: NEGATIVE

## 2022-05-28 ENCOUNTER — TELEMEDICINE (OUTPATIENT)
Dept: FAMILY MEDICINE CLINIC | Facility: CLINIC | Age: 57
End: 2022-05-28
Payer: COMMERCIAL

## 2022-05-28 DIAGNOSIS — U07.1 COVID-19: Primary | ICD-10-CM

## 2022-05-28 PROCEDURE — 99213 OFFICE O/P EST LOW 20 MIN: CPT | Performed by: FAMILY MEDICINE

## 2022-05-28 PROCEDURE — 1036F TOBACCO NON-USER: CPT | Performed by: FAMILY MEDICINE

## 2022-05-28 NOTE — PROGRESS NOTES
COVID-19 Outpatient Progress Note    Assessment/Plan:    Problem List Items Addressed This Visit    None     Visit Diagnoses     COVID-19    -  Primary    Relevant Medications    nirmatrelvir & ritonavir (Paxlovid) tablet therapy pack         Disposition:     Patient has COVID-19 infection  Based off CDC guidelines, they were recommended to isolate for 5 days from the date of the positive test  If they remain asymptomatic, isolation may be ended followed by 5 days of wearing a mask when around othes to minimize risk of infecting others  If they have a fever, continue to stay home until fever resolves for at least 24 hours  I have spent 15 minutes directly with the patient  Encounter provider Harsha Adhikari MD    Provider located at [address] [address]    Recent Visits  No visits were found meeting these conditions  Showing recent visits within past 7 days and meeting all other requirements  Today's Visits  Date Type Provider Dept   05/28/22 Telemedicine Harsha Adhikari, [facility] today's visits and meeting all other requirements  Future Appointments  No visits were found meeting these conditions  Showing future appointments within next 150 days and meeting all other requirements     This virtual check-in was done via telephone and she agrees to proceed  Patient agrees to participate in a virtual check in via telephone or video visit instead of presenting to the office to address urgent/immediate medical needs  Patient is aware this is a billable service  After connecting through Telephone, the patient was identified by name and date of birth  Julianna Stroud was informed that this was a telemedicine visit and that the exam was being conducted confidentially over secure lines  My office door was closed  No one else was in the room   Julianna Stroud acknowledged consent and understanding of privacy and security of the telemedicine visit  I informed the patient that I have reviewed her record in Epic and presented the opportunity for her to ask any questions regarding the visit today  The patient agreed to participate  It was my intent to perform this visit via video technology but the patient was not able to do a video connection so the visit was completed via audio telephone only  Verification of patient location:  Patient is located in the following state in which I hold an active license: [place]    Subjective:   Terri Veras is a 64 y o  female who has been screened for COVID-19  Symptom change since last report: worsening  Patient's symptoms include fever, chills, fatigue, malaise, nasal congestion, cough and myalgias  Patient denies rhinorrhea, sore throat, anosmia, loss of taste, shortness of breath, chest tightness, abdominal pain, nausea, vomiting, diarrhea and headaches  - Date of symptom onset: 5/25/2022  - Date of positive COVID-19 test: 5/26/2022  Type of test: Home antigen  Patient with typical symptoms of COVID-19 and they attest that they were positive on home rapid antigen testing  Image of positive result is not able to be uploaded into their chart  COVID-19 vaccination status: Fully vaccinated (primary series)    Sandi Rodrigez has been staying home and has isolated themselves in her home  She is taking care to not share personal items and is cleaning all surfaces that are touched often, like counters, tabletops, and doorknobs using household cleaning sprays or wipes  She is wearing a mask when she leaves her room  Lab Results   Component Value Date    SARSCOV2 Negative 05/02/2022    SARSCOV2 Not Detected 01/14/2021     History reviewed  No pertinent past medical history    Past Surgical History:   Procedure Laterality Date    ELBOW FRACTURE REPAIR Left 8/13/2016    Procedure: OPEN REDUCTION W/ INTERNAL FIXATION (ORIF) ELBOW;  Surgeon: Ridge Santiago MD;  [facility];  Service:    Eli Morrison DEBRIDEMENT      HAND DEBRIDEMENT Left 2/26/2017    Procedure: Arthrotomy, I/D left elblow;  Surgeon: Marlon Tapia MD;  [facility];  Service:     ORIF HUMERUS FRACTURE Left 8/13/2016    Procedure: OPEN REDUCTION W/ INTERNAL FIXATION (ORIF) HUMERUS MIDSHAFT / DISTAL;  Surgeon: Caffie Meckel, MD;  [facility];  Service:     ORIF WRIST FRACTURE Left 8/13/2016    Procedure: OPEN REDUCTION W/ INTERNAL FIXATION (ORIF) RADIUS / Farrah Sifuentes (WRIST); Surgeon: Caffie Meckel, MD;  [facility];  Service:    Idelia Rumple AND ADNOIDECTOMY      WOUND DEBRIDEMENT Left 8/17/2016    Procedure: DEBRIDEMENT UPPER EXTREMITY ([address]) AND WOUND CLOSURE;  Surgeon: Lissett Hernandez MD;  [facility];  Service:     WOUND DEBRIDEMENT Left 8/15/2016    Procedure: DEBRIDEMENT WOUND AND DRESSING CHANGE ([address])  Left forearm;  Surgeon: Caffie Meckel, MD;  [facility];  Service:     WOUND DEBRIDEMENT Left 8/13/2016    Procedure: DEBRIDEMENT WOUND [facility] OUT); Surgeon: Caffie Meckel, MD;  [facility];  Service:      Current Outpatient Medications   Medication Sig Dispense Refill    nirmatrelvir & ritonavir (Paxlovid) tablet therapy pack Take 3 tablets by mouth 2 (two) times a day for 5 days Take 2 nirmatrelvir tablets + 1 ritonavir tablet together per dose 30 tablet 0     No current facility-administered medications for this visit  No Known Allergies    Review of Systems   Constitutional: Positive for chills, fatigue and fever  HENT: Positive for congestion  Negative for rhinorrhea and sore throat  Eyes: Negative  Respiratory: Positive for cough  Negative for chest tightness and shortness of breath  Cardiovascular: Negative  Gastrointestinal: Negative  Negative for abdominal pain, diarrhea, nausea and vomiting  Endocrine: Negative  Genitourinary: Negative  Musculoskeletal: Positive for myalgias  Skin: Negative      Allergic/Immunologic: Negative  Neurological: Negative  Negative for headaches  Hematological: Negative  Psychiatric/Behavioral: Negative  Objective: There were no vitals filed for this visit  Physical Exam   It was my intent to perform this visit via video technology but the patient was not able to do a video connection so the visit was completed via audio telephone only  VIRTUAL VISIT DISCLAIMER    Lisandro Arreguin verbally agrees to participate in [employer]  Pt is aware that [employer] could be limited without vital signs or the ability to perform a full hands-on physical Hedda Fill understands she or the provider may request at any time to terminate the video visit and request the patient to seek care or treatment in person

## 2022-07-11 ENCOUNTER — OFFICE VISIT (OUTPATIENT)
Dept: FAMILY MEDICINE CLINIC | Facility: CLINIC | Age: 57
End: 2022-07-11
Payer: COMMERCIAL

## 2022-07-11 VITALS
TEMPERATURE: 97.9 F | SYSTOLIC BLOOD PRESSURE: 126 MMHG | RESPIRATION RATE: 16 BRPM | BODY MASS INDEX: 22.91 KG/M2 | HEIGHT: 67 IN | HEART RATE: 62 BPM | WEIGHT: 146 LBS | DIASTOLIC BLOOD PRESSURE: 78 MMHG | OXYGEN SATURATION: 98 %

## 2022-07-11 DIAGNOSIS — J01.00 ACUTE NON-RECURRENT MAXILLARY SINUSITIS: Primary | ICD-10-CM

## 2022-07-11 DIAGNOSIS — Z12.31 ENCOUNTER FOR SCREENING MAMMOGRAM FOR MALIGNANT NEOPLASM OF BREAST: ICD-10-CM

## 2022-07-11 PROBLEM — R00.2 PALPITATIONS: Status: RESOLVED | Noted: 2017-01-04 | Resolved: 2022-07-11

## 2022-07-11 PROCEDURE — 99213 OFFICE O/P EST LOW 20 MIN: CPT | Performed by: FAMILY MEDICINE

## 2022-07-11 RX ORDER — AZITHROMYCIN 250 MG/1
TABLET, FILM COATED ORAL
Qty: 6 TABLET | Refills: 0 | Status: SHIPPED | OUTPATIENT
Start: 2022-07-11 | End: 2022-07-16

## 2022-07-11 NOTE — PROGRESS NOTES
Assessment/Plan:    No problem-specific Assessment & Plan notes found for this encounter  Diagnoses and all orders for this visit:    Acute non-recurrent maxillary sinusitis  -     azithromycin (ZITHROMAX) 250 mg tablet; Take 500mg on day 1, 250mg on days 2-5    Encounter for screening mammogram for malignant neoplasm of breast  -     Mammo screening bilateral w 3d & cad; Future              Return if symptoms worsen or fail to improve  Subjective:      Patient ID: Khadijah Shields is a 64 y o  female  Chief Complaint   Patient presents with    Cough     Sore throat, congestion, for the past  Neg raquel Trujillo MA        Cough  This is a new problem  The current episode started in the past 7 days  The problem has been gradually worsening  The problem occurs hourly  The cough is productive of purulent sputum  Associated symptoms include headaches, nasal congestion and a sore throat  Pertinent negatives include no chest pain, chills, ear congestion, ear pain, fever, heartburn, hemoptysis, myalgias, postnasal drip, rash, rhinorrhea, shortness of breath, sweats, weight loss or wheezing  The symptoms are aggravated by lying down  1w  Charlie Suazo NP at work  Cough  Hoarse  Mucus is green/yellow  Ozzy in am  Bad headache worse with cough  Used some otc    No sick contacts at home  Works in a office setting 2 days/week    Nonsmoker  No fever  Some chills    The following portions of the patient's history were reviewed and updated as appropriate: allergies, current medications, past family history, past medical history, past social history, past surgical history and problem list     Review of Systems   Constitutional: Negative for chills, fever and weight loss  HENT: Positive for sore throat  Negative for ear pain, postnasal drip and rhinorrhea  Respiratory: Positive for cough  Negative for hemoptysis, shortness of breath and wheezing  Cardiovascular: Negative for chest pain     Gastrointestinal: Negative for heartburn  Musculoskeletal: Negative for myalgias  Skin: Negative for rash  Neurological: Positive for headaches  Current Outpatient Medications   Medication Sig Dispense Refill    azithromycin (ZITHROMAX) 250 mg tablet Take 500mg on day 1, 250mg on days 2-5 6 tablet 0     No current facility-administered medications for this visit  Objective:    /78   Pulse 62   Temp 97 9 °F (36 6 °C)   Resp 16   Ht 5' 7" (1 702 m)   Wt 66 2 kg (146 lb)   SpO2 98%   BMI 22 87 kg/m²        Physical Exam  Vitals and nursing note reviewed  Constitutional:       General: She is not in acute distress  Appearance: She is well-developed  She is not ill-appearing  HENT:      Head: Normocephalic  Right Ear: Tympanic membrane normal       Left Ear: Tympanic membrane normal       Nose: Congestion present  Comments: red     Mouth/Throat:      Pharynx: No oropharyngeal exudate  Eyes:      General: No scleral icterus  Conjunctiva/sclera: Conjunctivae normal    Cardiovascular:      Rate and Rhythm: Normal rate and regular rhythm  Heart sounds: No murmur heard  Pulmonary:      Effort: Pulmonary effort is normal  No respiratory distress  Breath sounds: No wheezing or rales  Abdominal:      Palpations: Abdomen is soft  Musculoskeletal:         General: No deformity  Cervical back: Neck supple  No rigidity  Skin:     General: Skin is warm and dry  Coloration: Skin is not pale  Neurological:      Mental Status: She is alert  Psychiatric:         Behavior: Behavior normal          Thought Content:  Thought content normal                 Garry Duane, DO

## 2023-03-22 ENCOUNTER — OFFICE VISIT (OUTPATIENT)
Dept: FAMILY MEDICINE CLINIC | Facility: CLINIC | Age: 58
End: 2023-03-22

## 2023-03-22 VITALS
HEART RATE: 112 BPM | WEIGHT: 152 LBS | RESPIRATION RATE: 16 BRPM | DIASTOLIC BLOOD PRESSURE: 90 MMHG | OXYGEN SATURATION: 96 % | HEIGHT: 67 IN | TEMPERATURE: 98.2 F | SYSTOLIC BLOOD PRESSURE: 146 MMHG | BODY MASS INDEX: 23.86 KG/M2

## 2023-03-22 DIAGNOSIS — J06.9 ACUTE URI: Primary | ICD-10-CM

## 2023-03-22 RX ORDER — BENZONATATE 200 MG/1
200 CAPSULE ORAL 3 TIMES DAILY PRN
Qty: 20 CAPSULE | Refills: 0 | Status: SHIPPED | OUTPATIENT
Start: 2023-03-22

## 2023-03-22 RX ORDER — AZITHROMYCIN 250 MG/1
TABLET, FILM COATED ORAL
Qty: 6 TABLET | Refills: 0 | Status: SHIPPED | OUTPATIENT
Start: 2023-03-22 | End: 2023-03-27

## 2023-03-22 NOTE — PROGRESS NOTES
Name: Steven Ba      : 1965      MRN: [id number]  Encounter Provider: David Workman MD  Encounter Date: 3/22/2023   Encounter department: [address] [place] [address]     1  Acute URI  -     azithromycin (Zithromax) 250 mg tablet; Take 2 tablets (500 mg total) by mouth daily for 1 day, THEN 1 tablet (250 mg total) daily for 4 days  -     benzonatate (TESSALON) 200 MG capsule; Take 1 capsule (200 mg total) by mouth 3 (three) times a day as needed for cough         Subjective      Cough  This is a new problem  The current episode started in the past 7 days  The problem has been gradually worsening  The problem occurs every few minutes  The cough is productive of sputum  Associated symptoms include nasal congestion, rhinorrhea and a sore throat  Pertinent negatives include no chest pain, chills, ear congestion, ear pain, fever, headaches, heartburn, hemoptysis, myalgias, postnasal drip, rash, shortness of breath, sweats, weight loss or wheezing  Risk factors for lung disease include animal exposure  COVID test negative x 2    Review of Systems   Constitutional: Negative for chills, fever and weight loss  HENT: Positive for rhinorrhea and sore throat  Negative for ear pain and postnasal drip  Respiratory: Positive for cough  Negative for hemoptysis, shortness of breath and wheezing  Cardiovascular: Negative for chest pain  Gastrointestinal: Negative for heartburn  Musculoskeletal: Negative for myalgias  Skin: Negative for rash  Neurological: Negative for headaches  No current outpatient medications on file prior to visit  Objective     /90   Pulse (!) 112   Temp 98 2 °F (36 8 °C)   Resp 16   Ht 5' 7" (1 702 m) Comment: per paitent  Wt 68 9 kg (152 lb)   SpO2 96%   BMI 23 81 kg/m²     Physical Exam  Constitutional:       General: She is not in acute distress  Appearance: She is well-developed  She is not diaphoretic     HENT:      Head: Normocephalic and atraumatic  Right Ear: Tympanic membrane, ear canal and external ear normal  There is no impacted cerumen  Left Ear: Tympanic membrane, ear canal and external ear normal  There is no impacted cerumen  Nose: Nose normal  No congestion or rhinorrhea  Mouth/Throat:      Mouth: Mucous membranes are moist       Pharynx: Oropharynx is clear  No oropharyngeal exudate or posterior oropharyngeal erythema  Eyes:      General: No scleral icterus  Right eye: No discharge  Left eye: No discharge  Conjunctiva/sclera: Conjunctivae normal    Cardiovascular:      Rate and Rhythm: Normal rate and regular rhythm  Heart sounds: Normal heart sounds  No murmur heard  No friction rub  No gallop  Pulmonary:      Effort: Pulmonary effort is normal  No respiratory distress  Breath sounds: Normal breath sounds  No wheezing or rales  Chest:      Chest wall: No tenderness  Musculoskeletal:         General: No deformity  Normal range of motion  Cervical back: Normal range of motion and neck supple  Skin:     General: Skin is warm and dry  Neurological:      Mental Status: She is alert and oriented to person, place, and time  Psychiatric:         Behavior: Behavior normal          Thought Content:  Thought content normal          Judgment: Judgment normal        Monserrat Foley MD

## 2023-05-09 ENCOUNTER — RA CDI HCC (OUTPATIENT)
Dept: OTHER | Facility: HOSPITAL | Age: 58
End: 2023-05-09

## 2023-05-09 NOTE — PROGRESS NOTES
[facility]  coding opportunities       Chart reviewed, no opportunity found: CHART REVIEWED, NO OPPORTUNITY FOUND        Patients Insurance        Commercial Insurance: Concha Rodriguez

## 2023-07-07 ENCOUNTER — OFFICE VISIT (OUTPATIENT)
Dept: FAMILY MEDICINE CLINIC | Facility: CLINIC | Age: 58
End: 2023-07-07
Payer: COMMERCIAL

## 2023-07-07 VITALS
TEMPERATURE: 96.5 F | SYSTOLIC BLOOD PRESSURE: 142 MMHG | RESPIRATION RATE: 16 BRPM | HEART RATE: 92 BPM | DIASTOLIC BLOOD PRESSURE: 92 MMHG | HEIGHT: 66 IN | BODY MASS INDEX: 24.29 KG/M2 | WEIGHT: 151.13 LBS

## 2023-07-07 DIAGNOSIS — F10.20 UNCOMPLICATED ALCOHOL DEPENDENCE (HCC): ICD-10-CM

## 2023-07-07 DIAGNOSIS — Z00.00 ANNUAL PHYSICAL EXAM: Primary | ICD-10-CM

## 2023-07-07 DIAGNOSIS — Z11.59 NEED FOR HEPATITIS C SCREENING TEST: ICD-10-CM

## 2023-07-07 DIAGNOSIS — Z13.0 SCREENING FOR DEFICIENCY ANEMIA: ICD-10-CM

## 2023-07-07 DIAGNOSIS — Z13.6 SCREENING FOR CARDIOVASCULAR CONDITION: ICD-10-CM

## 2023-07-07 PROCEDURE — 99396 PREV VISIT EST AGE 40-64: CPT | Performed by: FAMILY MEDICINE

## 2023-07-07 RX ORDER — ACAMPROSATE CALCIUM 333 MG/1
666 TABLET, DELAYED RELEASE ORAL 3 TIMES DAILY
Qty: 180 TABLET | Refills: 5 | Status: SHIPPED | OUTPATIENT
Start: 2023-07-07

## 2023-07-07 NOTE — PROGRESS NOTES
[address]    NAME: Julio Moore  AGE: 62 y.o. SEX: female  : 1965     DATE: 2023     Assessment and Plan:     Problem List Items Addressed This Visit     Alcohol dependence ([address])     Treatment options discussed   She is going to work on weaning down her alcohol  We will try acamprosate. If this medication is too costly we discussed trying naltrexone instead. Risk benefits of both medications discussed         Relevant Medications    acamprosate (CAMPRAL) 333 mg tablet   Other Visit Diagnoses     Annual physical exam    -  Primary    Need for hepatitis C screening test        Relevant Orders    Hepatitis C Antibody    Screening for cardiovascular condition        Relevant Orders    Comprehensive metabolic panel    Lipid Panel with Direct LDL reflex    Screening for deficiency anemia        Relevant Orders    CBC          Immunizations and preventive care screenings were discussed with patient today. Appropriate education was printed on patient's after visit summary. Counseling:  Alcohol/drug use: discussed moderation in alcohol intake, the recommendations for healthy alcohol use, and avoidance of illicit drug use. Dental Health: discussed importance of regular tooth brushing, flossing, and dental visits. Exercise: the importance of regular exercise/physical activity was discussed. Recommend exercise 3-5 times per week for at least 30 minutes. Return in about 3 months (around 10/7/2023) for Next scheduled follow up. Chief Complaint:     Chief Complaint   Patient presents with   • Annual Exam     Lw cma      History of Present Illness:     Adult Annual Physical   Patient here for a comprehensive physical exam. The patient reports She is concerned about her alcohol use. She started drinking beer to help her sleep. She is now up to 8 and night. She is not interested in [place]. She would like to try medication.   She denies feeling depressed. Diet and Physical Activity  Diet/Nutrition: well balanced diet. Exercise: walking. Depression Screening  PHQ-2/9 Depression Screening         General Health  Sleep: sleeps well with alcohol. Hearing: normal - bilateral.  Vision: no vision problems. Dental: regular dental visits. /GYN Health  Patient is: postmenopausal       Review of Systems:     Review of Systems   Constitutional: Negative. Respiratory: Negative. Cardiovascular: Negative. Past Medical History:     History reviewed. No pertinent past medical history. Past Surgical History:     Past Surgical History:   Procedure Laterality Date   • ELBOW FRACTURE REPAIR Left 8/13/2016    Procedure: OPEN REDUCTION W/ INTERNAL FIXATION (ORIF) ELBOW;  Surgeon: Jose De Jesus Monroy MD;  Location: [facility];  Service:    • FINGER DEBRIDEMENT     • HAND DEBRIDEMENT Left 2/26/2017    Procedure: Arthrotomy, I/D left elblow;  Surgeon: Caren Robert MD;  Location[facility];  Service:    • ORIF HUMERUS FRACTURE Left 8/13/2016    Procedure: OPEN REDUCTION W/ INTERNAL FIXATION (ORIF) HUMERUS MIDSHAFT / DISTAL;  Surgeon: Jose De Jesus Monroy MD;  [facility];  Service:    • ORIF WRIST FRACTURE Left 8/13/2016    Procedure: OPEN REDUCTION W/ INTERNAL FIXATION (ORIF) RADIUS / ULNA (WRIST); Surgeon: Jose De Jesus Monroy MD;  Location[facility];  Service:    • TONSILECTOMY AND ADNOIDECTOMY     • WOUND DEBRIDEMENT Left 8/17/2016    Procedure: DEBRIDEMENT UPPER EXTREMITY ([address]) AND WOUND CLOSURE;  Surgeon: Melissa Resendez MD;  [facility];  Service:    • WOUND DEBRIDEMENT Left 8/15/2016    Procedure: DEBRIDEMENT WOUND AND DRESSING CHANGE ([address]). Left forearm;  Surgeon: Jose De Jesus Monroy MD;  [facility];  Service:    • WOUND DEBRIDEMENT Left 8/13/2016    Procedure: DEBRIDEMENT WOUND [facility] OUT);   Surgeon: Jose De Jesus Monroy MD;  [facility];  Service:       Social History: Social History     Socioeconomic History   • Marital status: /Civil Union     Spouse name: None   • Number of children: None   • Years of education: None   • Highest education level: None   Occupational History   • None   Tobacco Use   • Smoking status: Former     Types: Cigarettes     Quit date:      Years since quittin.5   • Smokeless tobacco: Never   Vaping Use   • Vaping Use: Never used   Substance and Sexual Activity   • Alcohol use: Yes     Alcohol/week: 6.0 standard drinks of alcohol     Types: 6 Cans of beer per week     Comment: social   • Drug use: Never   • Sexual activity: Yes   Other Topics Concern   • None   Social History Narrative   • None     Social Determinants of Health     Financial Resource Strain: Not on file   Food Insecurity: Not on file   Transportation Needs: Not on file   Physical Activity: Not on file   Stress: Not on file   Social Connections: Not on file   Intimate Partner Violence: Not on file   Housing Stability: Not on file      Family History:     Family History   Problem Relation Age of Onset   • Autoimmune disease Mother    • Stomach cancer Father    • Liver disease Father    • Diabetes Father    • Lupus Sister    • Diabetes Brother    • Rheum arthritis Sister    • Bipolar disorder Sister    • Breast cancer Paternal Grandmother 80      Current Medications:     Current Outpatient Medications   Medication Sig Dispense Refill   • acamprosate (CAMPRAL) 333 mg tablet Take 2 tablets (666 mg total) by mouth 3 (three) times a day 180 tablet 5     No current facility-administered medications for this visit. Allergies:     No Known Allergies   Physical Exam:     /92   Pulse 92   Temp (!) 96.5 °F (35.8 °C) (Tympanic)   Resp 16   Ht 5' 6" (1.676 m)   Wt 68.5 kg (151 lb 2 oz)   BMI 24.39 kg/m²     Physical Exam  Vitals and nursing note reviewed. Constitutional:       Appearance: She is well-developed. HENT:      Head: Normocephalic and atraumatic. Right Ear: External ear normal.      Left Ear: External ear normal.      Nose: Nose normal.   Cardiovascular:      Rate and Rhythm: Normal rate and regular rhythm. Heart sounds: Normal heart sounds. No murmur heard. No friction rub. Pulmonary:      Effort: No respiratory distress. Breath sounds: Normal breath sounds. No wheezing or rales. Abdominal:      Palpations: Abdomen is soft. Tenderness: There is no abdominal tenderness. Musculoskeletal:      Right lower leg: No edema. Left lower leg: No edema. Neurological:      Mental Status: She is oriented to person, place, and time. Cranial Nerves: No cranial nerve deficit.         Vision Screening    Right eye Left eye Both eyes   Without correction      With correction 20/20 20/20 [address] [facility] [place]

## 2023-07-07 NOTE — ASSESSMENT & PLAN NOTE
Treatment options discussed   She is going to work on weaning down her alcohol  We will try acamprosate. If this medication is too costly we discussed trying naltrexone instead.   Risk benefits of both medications discussed

## 2023-07-31 ENCOUNTER — OFFICE VISIT (OUTPATIENT)
Dept: FAMILY MEDICINE CLINIC | Facility: CLINIC | Age: 58
End: 2023-07-31
Payer: COMMERCIAL

## 2023-07-31 VITALS
OXYGEN SATURATION: 98 % | WEIGHT: 151 LBS | BODY MASS INDEX: 24.27 KG/M2 | HEIGHT: 66 IN | DIASTOLIC BLOOD PRESSURE: 84 MMHG | TEMPERATURE: 97.4 F | HEART RATE: 88 BPM | RESPIRATION RATE: 22 BRPM | SYSTOLIC BLOOD PRESSURE: 140 MMHG

## 2023-07-31 DIAGNOSIS — J01.10 ACUTE FRONTAL SINUSITIS, RECURRENCE NOT SPECIFIED: Primary | ICD-10-CM

## 2023-07-31 PROCEDURE — 99213 OFFICE O/P EST LOW 20 MIN: CPT | Performed by: FAMILY MEDICINE

## 2023-07-31 PROCEDURE — 3725F SCREEN DEPRESSION PERFORMED: CPT | Performed by: FAMILY MEDICINE

## 2023-07-31 RX ORDER — AZITHROMYCIN 250 MG/1
TABLET, FILM COATED ORAL
Qty: 6 TABLET | Refills: 0 | Status: SHIPPED | OUTPATIENT
Start: 2023-07-31 | End: 2023-08-05

## 2023-07-31 NOTE — PROGRESS NOTES
Name: Jaya Servin      : 1965      MRN: [id number]  Encounter Provider: Karthik Mars DO  Encounter Date: 2023   Encounter department: [facility]    Assessment & Plan     1. Acute frontal sinusitis, recurrence not specified  -     azithromycin (ZITHROMAX) 250 mg tablet; Take 2 the first day, then take 1 daily        Return if symptoms worsen or fail to improve. Subjective      Started a week ago    Cough  This is a new problem. The current episode started in the past 7 days. The problem has been gradually worsening. The problem occurs every few hours. The cough is productive of purulent sputum. Associated symptoms include headaches and nasal congestion. Pertinent negatives include no chest pain, chills, ear congestion, ear pain, fever, heartburn, hemoptysis, myalgias, postnasal drip, rash, rhinorrhea, sore throat, shortness of breath, sweats, weight loss or wheezing. Risk factors for lung disease include travel. Review of Systems   Constitutional: Negative for chills, fever and weight loss. HENT: Negative for ear pain, postnasal drip, rhinorrhea and sore throat. Respiratory: Positive for cough. Negative for hemoptysis, shortness of breath and wheezing. Cardiovascular: Negative for chest pain. Gastrointestinal: Negative for heartburn. Musculoskeletal: Negative for myalgias. Skin: Negative for rash. Neurological: Positive for headaches. Current Outpatient Medications on File Prior to Visit   Medication Sig   • acamprosate (CAMPRAL) 333 mg tablet Take 2 tablets (666 mg total) by mouth 3 (three) times a day       Objective     /84   Pulse 88   Temp (!) 97.4 °F (36.3 °C)   Resp 22   Ht 5' 6" (1.676 m)   Wt 68.5 kg (151 lb)   SpO2 98%   BMI 24.37 kg/m²     Physical Exam  Vitals and nursing note reviewed. Constitutional:       Appearance: She is well-developed. HENT:      Head: Normocephalic and atraumatic.       Right Ear: Tympanic membrane and external ear normal.      Left Ear: Tympanic membrane and external ear normal.   Cardiovascular:      Rate and Rhythm: Normal rate and regular rhythm. Heart sounds: Normal heart sounds. No murmur heard. No friction rub. Pulmonary:      Effort: Pulmonary effort is normal. No respiratory distress. Breath sounds: Normal breath sounds. No wheezing or rales. Musculoskeletal:      Right lower leg: No edema. Left lower leg: No edema.      Bilateral frontal sinus tenderness  Alejandra Puls, DO

## 2023-10-10 ENCOUNTER — OFFICE VISIT (OUTPATIENT)
Dept: FAMILY MEDICINE CLINIC | Facility: CLINIC | Age: 58
End: 2023-10-10
Payer: COMMERCIAL

## 2023-10-10 VITALS
OXYGEN SATURATION: 97 % | BODY MASS INDEX: 24.91 KG/M2 | RESPIRATION RATE: 18 BRPM | TEMPERATURE: 98.6 F | DIASTOLIC BLOOD PRESSURE: 80 MMHG | SYSTOLIC BLOOD PRESSURE: 120 MMHG | HEIGHT: 66 IN | WEIGHT: 155 LBS | HEART RATE: 111 BPM

## 2023-10-10 DIAGNOSIS — J06.9 ACUTE URI: Primary | ICD-10-CM

## 2023-10-10 PROCEDURE — 87636 SARSCOV2 & INF A&B AMP PRB: CPT | Performed by: FAMILY MEDICINE

## 2023-10-10 PROCEDURE — 99214 OFFICE O/P EST MOD 30 MIN: CPT | Performed by: FAMILY MEDICINE

## 2023-10-10 RX ORDER — AZITHROMYCIN 250 MG/1
TABLET, FILM COATED ORAL
Qty: 6 TABLET | Refills: 0 | Status: SHIPPED | OUTPATIENT
Start: 2023-10-10 | End: 2023-10-15

## 2023-10-10 RX ORDER — BENZONATATE 200 MG/1
200 CAPSULE ORAL 3 TIMES DAILY PRN
Qty: 20 CAPSULE | Refills: 0 | Status: SHIPPED | OUTPATIENT
Start: 2023-10-10

## 2023-10-10 NOTE — PROGRESS NOTES
Name: Nohemi Salazar      : 1965      MRN: [id number]  Encounter Provider: Jami Flynn MD  Encounter Date: 10/10/2023   Encounter department: [address]     1. Acute URI  -     azithromycin (Zithromax) 250 mg tablet; Take 2 tablets (500 mg total) by mouth daily for 1 day, THEN 1 tablet (250 mg total) daily for 4 days. -     benzonatate (TESSALON) 200 MG capsule; Take 1 capsule (200 mg total) by mouth 3 (three) times a day as needed for cough  -     Covid/Flu- Office Collect       Counseled on rest, hydration, warm tea with honey/lemon, tylenol/ibuprofen for fevers/pain. Subjective      Cough  This is a new problem. The current episode started in the past 7 days. The problem has been rapidly worsening. The problem occurs hourly. The cough is productive of sputum. Associated symptoms include chest pain, chills, ear pain, headaches and a sore throat. Pertinent negatives include no ear congestion, fever, heartburn, hemoptysis, myalgias, nasal congestion, postnasal drip, rash, rhinorrhea, shortness of breath, sweats, weight loss or wheezing. The symptoms are aggravated by exercise. Review of Systems   Constitutional: Positive for chills. Negative for fever and weight loss. HENT: Positive for ear pain and sore throat. Negative for postnasal drip and rhinorrhea. Respiratory: Negative for hemoptysis, shortness of breath and wheezing. Cardiovascular: Positive for chest pain. Gastrointestinal: Negative for heartburn. Musculoskeletal: Negative for myalgias. Skin: Negative for rash. Neurological: Positive for headaches.        Current Outpatient Medications on File Prior to Visit   Medication Sig   • acamprosate (CAMPRAL) 333 mg tablet Take 2 tablets (666 mg total) by mouth 3 (three) times a day       Objective     /80   Pulse (!) 111   Temp 98.6 °F (37 °C)   Resp 18   Ht 5' 6" (1.676 m)   Wt 70.3 kg (155 lb)   SpO2 97%   BMI 25.02 kg/m² Physical Exam  Constitutional:       General: She is not in acute distress. Appearance: She is well-developed. She is not diaphoretic. HENT:      Head: Normocephalic and atraumatic. Right Ear: Tympanic membrane, ear canal and external ear normal. There is no impacted cerumen. Left Ear: Tympanic membrane, ear canal and external ear normal. There is no impacted cerumen. Nose: Nose normal. No congestion or rhinorrhea. Mouth/Throat:      Mouth: Mucous membranes are moist.      Pharynx: Oropharynx is clear. No oropharyngeal exudate or posterior oropharyngeal erythema. Eyes:      General: No scleral icterus. Right eye: No discharge. Left eye: No discharge. Conjunctiva/sclera: Conjunctivae normal.   Cardiovascular:      Rate and Rhythm: Normal rate and regular rhythm. Heart sounds: Normal heart sounds. No murmur heard. No friction rub. No gallop. Pulmonary:      Effort: Pulmonary effort is normal. No respiratory distress. Breath sounds: Normal breath sounds. No wheezing or rales. Chest:      Chest wall: No tenderness. Musculoskeletal:         General: No deformity. Normal range of motion. Cervical back: Normal range of motion and neck supple. Skin:     General: Skin is warm and dry. Neurological:      Mental Status: She is alert and oriented to person, place, and time. Psychiatric:         Behavior: Behavior normal.         Thought Content:  Thought content normal.         Judgment: Judgment normal.       Jude Dickey MD

## 2023-10-11 LAB
FLUAV RNA RESP QL NAA+PROBE: NEGATIVE
FLUBV RNA RESP QL NAA+PROBE: NEGATIVE
SARS-COV-2 RNA RESP QL NAA+PROBE: NEGATIVE

## 2023-10-20 ENCOUNTER — HOSPITAL ENCOUNTER (OUTPATIENT)
Dept: RADIOLOGY | Facility: HOSPITAL | Age: 58
Discharge: HOME | End: 2023-10-20
Payer: COMMERCIAL

## 2023-10-20 VITALS — HEIGHT: 67 IN | BODY MASS INDEX: 24.33 KG/M2 | WEIGHT: 155 LBS

## 2023-10-20 DIAGNOSIS — Z12.31 ENCOUNTER FOR SCREENING MAMMOGRAM FOR MALIGNANT NEOPLASM OF BREAST: ICD-10-CM

## 2023-10-20 PROCEDURE — 77063 BREAST TOMOSYNTHESIS BI: CPT

## 2023-10-20 PROCEDURE — 77067 SCR MAMMO BI INCL CAD: CPT

## 2023-11-27 ENCOUNTER — OFFICE VISIT (OUTPATIENT)
Dept: FAMILY MEDICINE CLINIC | Facility: CLINIC | Age: 58
End: 2023-11-27
Payer: COMMERCIAL

## 2023-11-27 VITALS
HEIGHT: 67 IN | OXYGEN SATURATION: 97 % | RESPIRATION RATE: 16 BRPM | DIASTOLIC BLOOD PRESSURE: 90 MMHG | HEART RATE: 100 BPM | BODY MASS INDEX: 24.64 KG/M2 | SYSTOLIC BLOOD PRESSURE: 140 MMHG | WEIGHT: 157 LBS | TEMPERATURE: 98.2 F

## 2023-11-27 DIAGNOSIS — S90.122A CONTUSION OF TOE OF LEFT FOOT, UNSPECIFIED TOE, INITIAL ENCOUNTER: Primary | ICD-10-CM

## 2023-11-27 PROCEDURE — 99213 OFFICE O/P EST LOW 20 MIN: CPT | Performed by: FAMILY MEDICINE

## 2023-11-27 NOTE — PROGRESS NOTES
Name: Leon Sahu      : 1965      MRN: [id number]  Encounter Provider: Henry Masterson DO  Encounter Date: 2023   Encounter department: [facility]    Assessment & Plan     1. Contusion of toe of left foot, unspecified toe, initial encounter  Comments:  elevation and buddy taping advised  Orders:  -     XR foot 3+ vw left; Future; Expected date: 2023       Will get x-ray if her pain doesn't improve in a few days  She felt better after having the toe taped   Return if symptoms worsen or fail to improve. Subjective      She had a stool drop on her left 4th toe last night. It is now bruised and swollen. She put ice on it last night. Review of Systems    Current Outpatient Medications on File Prior to Visit   Medication Sig   • acamprosate (CAMPRAL) 333 mg tablet Take 2 tablets (666 mg total) by mouth 3 (three) times a day   • [DISCONTINUED] benzonatate (TESSALON) 200 MG capsule Take 1 capsule (200 mg total) by mouth 3 (three) times a day as needed for cough       Objective     /90   Pulse 100   Temp 98.2 °F (36.8 °C)   Resp 16   Ht 5' 7" (1.702 m)   Wt 71.2 kg (157 lb)   SpO2 97%   BMI 24.59 kg/m²     Physical Exam  Vitals and nursing note reviewed. Constitutional:       Appearance: She is well-developed. HENT:      Head: Normocephalic and atraumatic. Right Ear: Tympanic membrane and external ear normal.      Left Ear: Tympanic membrane and external ear normal.   Cardiovascular:      Rate and Rhythm: Normal rate and regular rhythm. Heart sounds: Normal heart sounds. No murmur heard. No friction rub. Pulmonary:      Effort: Pulmonary effort is normal. No respiratory distress. Breath sounds: Normal breath sounds. No wheezing or rales. Musculoskeletal:         General: Tenderness (left 4th toe, pain with palpation and range of motion) present. Right lower leg: No edema. Left lower leg: No edema.            Henry Masterson DO

## 2023-12-23 ENCOUNTER — HOSPITAL ENCOUNTER (EMERGENCY)
Facility: HOSPITAL | Age: 58
Discharge: HOME | End: 2023-12-23
Attending: EMERGENCY MEDICINE
Payer: COMMERCIAL

## 2023-12-23 ENCOUNTER — APPOINTMENT (EMERGENCY)
Dept: RADIOLOGY | Facility: HOSPITAL | Age: 58
End: 2023-12-23
Payer: COMMERCIAL

## 2023-12-23 ENCOUNTER — APPOINTMENT (EMERGENCY)
Dept: RADIOLOGY | Facility: HOSPITAL | Age: 58
End: 2023-12-23
Attending: EMERGENCY MEDICINE
Payer: COMMERCIAL

## 2023-12-23 VITALS
TEMPERATURE: 98.3 F | OXYGEN SATURATION: 97 % | SYSTOLIC BLOOD PRESSURE: 140 MMHG | DIASTOLIC BLOOD PRESSURE: 80 MMHG | RESPIRATION RATE: 20 BRPM | HEART RATE: 78 BPM

## 2023-12-23 DIAGNOSIS — N83.201 RIGHT OVARIAN CYST: Primary | ICD-10-CM

## 2023-12-23 LAB
ALBUMIN SERPL BCP-MCNC: 4.4 G/DL (ref 3.5–5)
ALP SERPL-CCNC: 86 U/L (ref 34–104)
ALT SERPL W P-5'-P-CCNC: 24 U/L (ref 7–52)
ANION GAP SERPL CALCULATED.3IONS-SCNC: 8 MMOL/L
AST SERPL W P-5'-P-CCNC: 25 U/L (ref 13–39)
BASOPHILS # BLD AUTO: 0.04 THOUSANDS/ÂΜL (ref 0–0.1)
BASOPHILS NFR BLD AUTO: 1 % (ref 0–1)
BILIRUB SERPL-MCNC: 0.47 MG/DL (ref 0.2–1)
BILIRUB UR QL STRIP: NEGATIVE
BUN SERPL-MCNC: 13 MG/DL (ref 5–25)
CALCIUM SERPL-MCNC: 9.4 MG/DL (ref 8.4–10.2)
CHLORIDE SERPL-SCNC: 103 MMOL/L (ref 96–108)
CLARITY UR: CLEAR
CO2 SERPL-SCNC: 24 MMOL/L (ref 21–32)
COLOR UR: YELLOW
CREAT SERPL-MCNC: 0.54 MG/DL (ref 0.6–1.3)
EOSINOPHIL # BLD AUTO: 0.09 THOUSAND/ÂΜL (ref 0–0.61)
EOSINOPHIL NFR BLD AUTO: 1 % (ref 0–6)
ERYTHROCYTE [DISTWIDTH] IN BLOOD BY AUTOMATED COUNT: 11.9 % (ref 11.6–15.1)
GFR SERPL CREATININE-BSD FRML MDRD: 104 ML/MIN/1.73SQ M
GLUCOSE SERPL-MCNC: 98 MG/DL (ref 65–140)
GLUCOSE UR STRIP-MCNC: NEGATIVE MG/DL
HCT VFR BLD AUTO: 44.2 % (ref 34.8–46.1)
HGB BLD-MCNC: 15.2 G/DL (ref 11.5–15.4)
HGB UR QL STRIP.AUTO: NEGATIVE
IMM GRANULOCYTES # BLD AUTO: 0.02 THOUSAND/UL (ref 0–0.2)
IMM GRANULOCYTES NFR BLD AUTO: 0 % (ref 0–2)
KETONES UR STRIP-MCNC: NEGATIVE MG/DL
LEUKOCYTE ESTERASE UR QL STRIP: NEGATIVE
LYMPHOCYTES # BLD AUTO: 1.77 THOUSANDS/ÂΜL (ref 0.6–4.47)
LYMPHOCYTES NFR BLD AUTO: 28 % (ref 14–44)
MCH RBC QN AUTO: 30.5 PG (ref 26.8–34.3)
MCHC RBC AUTO-ENTMCNC: 34.4 G/DL (ref 31.4–37.4)
MCV RBC AUTO: 89 FL (ref 82–98)
MONOCYTES # BLD AUTO: 0.71 THOUSAND/ÂΜL (ref 0.17–1.22)
MONOCYTES NFR BLD AUTO: 11 % (ref 4–12)
NEUTROPHILS # BLD AUTO: 3.78 THOUSANDS/ÂΜL (ref 1.85–7.62)
NEUTS SEG NFR BLD AUTO: 59 % (ref 43–75)
NITRITE UR QL STRIP: NEGATIVE
NRBC BLD AUTO-RTO: 0 /100 WBCS
PH UR STRIP.AUTO: 6 [PH]
PLATELET # BLD AUTO: 224 THOUSANDS/UL (ref 149–390)
PMV BLD AUTO: 9.3 FL (ref 8.9–12.7)
POTASSIUM SERPL-SCNC: 4 MMOL/L (ref 3.5–5.3)
PROT SERPL-MCNC: 7.4 G/DL (ref 6.4–8.4)
PROT UR STRIP-MCNC: NEGATIVE MG/DL
RBC # BLD AUTO: 4.99 MILLION/UL (ref 3.81–5.12)
SODIUM SERPL-SCNC: 135 MMOL/L (ref 135–147)
SP GR UR STRIP.AUTO: 1.01 (ref 1–1.03)
UROBILINOGEN UR QL STRIP.AUTO: 0.2 E.U./DL
WBC # BLD AUTO: 6.41 THOUSAND/UL (ref 4.31–10.16)

## 2023-12-23 PROCEDURE — 80053 COMPREHEN METABOLIC PANEL: CPT | Performed by: EMERGENCY MEDICINE

## 2023-12-23 PROCEDURE — 36415 COLL VENOUS BLD VENIPUNCTURE: CPT | Performed by: EMERGENCY MEDICINE

## 2023-12-23 PROCEDURE — 85025 COMPLETE CBC W/AUTO DIFF WBC: CPT | Performed by: EMERGENCY MEDICINE

## 2023-12-23 PROCEDURE — 99285 EMERGENCY DEPT VISIT HI MDM: CPT | Performed by: EMERGENCY MEDICINE

## 2023-12-23 PROCEDURE — 76856 US EXAM PELVIC COMPLETE: CPT

## 2023-12-23 PROCEDURE — G1004 CDSM NDSC: HCPCS

## 2023-12-23 PROCEDURE — 81003 URINALYSIS AUTO W/O SCOPE: CPT | Performed by: EMERGENCY MEDICINE

## 2023-12-23 PROCEDURE — 74177 CT ABD & PELVIS W/CONTRAST: CPT

## 2023-12-23 PROCEDURE — 76830 TRANSVAGINAL US NON-OB: CPT

## 2023-12-23 PROCEDURE — 99284 EMERGENCY DEPT VISIT MOD MDM: CPT

## 2023-12-23 RX ADMIN — IOHEXOL 100 ML: 350 INJECTION, SOLUTION INTRAVENOUS at 13:30

## 2023-12-23 NOTE — DISCHARGE INSTRUCTIONS
Return to the ER for further concerns or worsening symptoms  Follow up with your primary care physician in 1-2 days  Continue tylenol and ibuprofen as needed for pain

## 2023-12-23 NOTE — ED PROVIDER NOTES
History  Chief Complaint   Patient presents with    Abdominal Pain     Startedd with R sided abd/side pain 2 nights ago getting worse. No other assoc symptoms     Patient is a 58-year-old female that presents to the emergency department complaint of right-sided abdominal pain x 2 days.  Pain is worse with moving and radiates into her flank.  She denies trauma.  She denies a history of similar symptoms.      History provided by:  Patient   used: No    Abdominal Pain  Associated symptoms: no chills, no cough, no diarrhea, no dysuria, no fever, no hematuria, no nausea, no shortness of breath and no vomiting        Prior to Admission Medications   Prescriptions Last Dose Informant Patient Reported? Taking?   acamprosate (CAMPRAL) 333 mg tablet Not Taking  No No   Sig: Take 2 tablets (666 mg total) by mouth 3 (three) times a day   Patient not taking: Reported on 12/23/2023      Facility-Administered Medications: None       History reviewed. No pertinent past medical history.    Past Surgical History:   Procedure Laterality Date    ELBOW FRACTURE REPAIR Left 8/13/2016    Procedure: OPEN REDUCTION W/ INTERNAL FIXATION (ORIF) ELBOW;  Surgeon: Venecia Cabezas MD;  Location: [facility];  Service:     FINGER DEBRIDEMENT      HAND DEBRIDEMENT Left 2/26/2017    Procedure: Arthrotomy, I/D left elblow;  Surgeon: Richar Arroyo MD;  Location: [facility];  Service:     ORIF HUMERUS FRACTURE Left 8/13/2016    Procedure: OPEN REDUCTION W/ INTERNAL FIXATION (ORIF) HUMERUS MIDSHAFT / DISTAL;  Surgeon: Venecia Cabezas MD;  [facility];  Service:     ORIF WRIST FRACTURE Left 8/13/2016    Procedure: OPEN REDUCTION W/ INTERNAL FIXATION (ORIF) RADIUS / ULNA (WRIST);  Surgeon: Venecia Cabezas MD;  [facility];  Service:     TONSILECTOMY AND ADNOIDECTOMY      WOUND DEBRIDEMENT Left 8/17/2016    Procedure: DEBRIDEMENT UPPER EXTREMITY (WASH OUT) AND WOUND CLOSURE;  Surgeon: Laron Quispe MD;   [facility];  Service:     WOUND DEBRIDEMENT Left 8/15/2016    Procedure: DEBRIDEMENT WOUND AND DRESSING CHANGE (WASH OUT). Left forearm;  Surgeon: Venecia Cabezas MD;  [facility];  Service:     WOUND DEBRIDEMENT Left 2016    Procedure: DEBRIDEMENT WOUND (WASH OUT);  Surgeon: Venecia Cabezas MD;  [facility];  Service:        Family History   Problem Relation Age of Onset    Autoimmune disease Mother     Stomach cancer Father     Liver disease Father     Diabetes Father     Lupus Sister     Diabetes Brother     Rheum arthritis Sister     Bipolar disorder Sister     Breast cancer Paternal Grandmother 80     I have reviewed and agree with the history as documented.    E-Cigarette/Vaping    E-Cigarette Use Never User      E-Cigarette/Vaping Substances    Nicotine No     THC No     CBD No     Flavoring No     Other No     Unknown No      Social History     Tobacco Use    Smoking status: Former     Current packs/day: 0.00     Average packs/day: 1 pack/day for 22.0 years (22.0 ttl pk-yrs)     Types: Cigarettes     Start date:      Quit date:      Years since quittin.9    Smokeless tobacco: Never   Vaping Use    Vaping status: Never Used   Substance Use Topics    Alcohol use: Yes     Alcohol/week: 6.0 standard drinks of alcohol     Types: 6 Cans of beer per week     Comment: social    Drug use: Never       Review of Systems   Constitutional:  Negative for chills and fever.   Respiratory:  Negative for cough, chest tightness and shortness of breath.    Gastrointestinal:  Positive for abdominal pain. Negative for diarrhea, nausea and vomiting.   Genitourinary:  Negative for dysuria, frequency, hematuria and urgency.   Musculoskeletal:  Negative for back pain, neck pain and neck stiffness.   All other systems reviewed and are negative.      Physical Exam  Physical Exam  Vitals and nursing note reviewed.   Constitutional:       General: She is not in acute distress.      Appearance: She is well-developed. She is not diaphoretic.   HENT:      Head: Normocephalic and atraumatic.   Eyes:      Conjunctiva/sclera: Conjunctivae normal.      Pupils: Pupils are equal, round, and reactive to light.   Cardiovascular:      Rate and Rhythm: Normal rate and regular rhythm.      Heart sounds: Normal heart sounds. No murmur heard.  Pulmonary:      Effort: Pulmonary effort is normal. No respiratory distress.      Breath sounds: Normal breath sounds.   Abdominal:      General: Abdomen is flat. Bowel sounds are normal. There is no distension.      Palpations: Abdomen is soft.      Tenderness: There is abdominal tenderness in the right lower quadrant. There is right CVA tenderness.   Musculoskeletal:         General: No deformity. Normal range of motion.      Cervical back: Normal range of motion and neck supple.   Skin:     General: Skin is warm and dry.      Capillary Refill: Capillary refill takes less than 2 seconds.      Coloration: Skin is not pale.      Findings: No rash.   Neurological:      General: No focal deficit present.      Mental Status: She is alert and oriented to person, place, and time.      Cranial Nerves: No cranial nerve deficit.   Psychiatric:         Mood and Affect: Mood is anxious.         Behavior: Behavior normal.         Vital Signs  ED Triage Vitals [12/23/23 1148]   Temperature Pulse Respirations Blood Pressure SpO2   98.3 °F (36.8 °C) 97 22 164/92 97 %      Temp Source Heart Rate Source Patient Position - Orthostatic VS BP Location FiO2 (%)   Tympanic Monitor Sitting Right arm --      Pain Score       5           Vitals:    12/23/23 1148 12/23/23 1359 12/23/23 1400 12/23/23 1625   BP: 164/92 150/85 150/85 140/80   Pulse: 97 77 76 78   Patient Position - Orthostatic VS: Sitting Lying Lying Lying         Visual Acuity      ED Medications  Medications   iohexol (OMNIPAQUE) 350 MG/ML injection (MULTI-DOSE) 100 mL (100 mL Intravenous Given 12/23/23 1330)       Diagnostic  Studies  Results Reviewed       Procedure Component Value Units Date/Time    Comprehensive metabolic panel [[id number]]  (Abnormal) Collected: 12/23/23 1234    Lab Status: Final result Specimen: Blood from Arm, Right Updated: 12/23/23 1258     Sodium 135 mmol/L      Potassium 4.0 mmol/L      Chloride 103 mmol/L      CO2 24 mmol/L      ANION GAP 8 mmol/L      BUN 13 mg/dL      Creatinine 0.54 mg/dL      Glucose 98 mg/dL      Calcium 9.4 mg/dL      AST 25 U/L      ALT 24 U/L      Alkaline Phosphatase 86 U/L      Total Protein 7.4 g/dL      Albumin 4.4 g/dL      Total Bilirubin 0.47 mg/dL      eGFR 104 ml/min/1.73sq m     Narrative:      National Kid[employer] Foundation guidelines for Chronic Kidney Disease (CKD):     Stage 1 with normal or high GFR (GFR > 90 mL/min/1.73 square meters)    Stage 2 Mild CKD (GFR = 60-89 mL/min/1.73 square meters)    Stage 3A Moderate CKD (GFR = 45-59 mL/min/1.73 square meters)    Stage 3B Moderate CKD (GFR = 30-44 mL/min/1.73 square meters)    Stage 4 Severe CKD (GFR = 15-29 mL/min/1.73 square meters)    Stage 5 End Stage CKD (GFR <15 mL/min/1.73 square meters)  Note: GFR calculation is accurate only with a steady state creatinine    UA w Reflex to Microscopic w Reflex to Culture [[id number]] Collected: 12/23/23 1237    Lab Status: Final result Specimen: Urine, Clean Catch Updated: 12/23/23 1248     Color, UA Yellow     Clarity, UA Clear     Specific Gravity, UA 1.010     pH, UA 6.0     Leukocytes, UA Negative     Nitrite, UA Negative     Protein, UA Negative mg/dl      Glucose, UA Negative mg/dl      Ketones, UA Negative mg/dl      Urobilinogen, UA 0.2 E.U./dl      Bilirubin, UA Negative     Occult Blood, UA Negative    CBC and differential [[id number]] Collected: 12/23/23 1234    Lab Status: Final result Specimen: Blood from Arm, Right Updated: 12/23/23 1240     WBC 6.41 Thousand/uL      RBC 4.99 Million/uL      Hemoglobin 15.2 g/dL      Hematocrit 44.2 %      MCV 89 fL      MCH 30.5 pg       MCHC 34.4 g/dL      RDW 11.9 %      MPV 9.3 fL      Platelets 224 Thousands/uL      nRBC 0 /100 WBCs      Neutrophils Relative 59 %      Immat GRANS % 0 %      Lymphocytes Relative 28 %      Monocytes Relative 11 %      Eosinophils Relative 1 %      Basophils Relative 1 %      Neutrophils Absolute 3.78 Thousands/µL      Immature Grans Absolute 0.02 Thousand/uL      Lymphocytes Absolute 1.77 Thousands/µL      Monocytes Absolute 0.71 Thousand/µL      Eosinophils Absolute 0.09 Thousand/µL      Basophils Absolute 0.04 Thousands/µL                    US pelvis complete w transvaginal   Final Result by Oumar Sanchez MD (12/23 1612)      Heterogeneous endometrium with a small quantity of fluid. Follow-up with nonurgent gynecological consultation      Right adnexal cysts measuring 26 mm and smaller.      The study was marked in EPIC for immediate notification.                              Workstation performed: [id number]         CT abdomen pelvis with contrast   Final Result by Adam Walter MD (12/23 5209)      No acute abdominopelvic abnormality identified. No free air or free fluid.   Normal appendix.      Hepatic steatosis.      2.7 cm nonspecific right adnexal cystic lesion in the right hemipelvis possibly paraovarian cyst. This can be further evaluated with pelvic ultrasound as clinically warranted.            Workstation performed: [id number]                    Procedures  Procedures         ED Course                                             Medical Decision Making  59 yo female in ED with complaint of right-sided abdominal pain.  Differential diagnosis includes but is not limited to acute appendicitis, pyelonephritis, UTI, ovarian cyst, musculoskeletal pain.  Labs, CT abdomen pelvis, pelvic ultrasound ordered and reviewed.  Patient with a 2.7 cm right adnexal cyst.  Patient refuses pain meds in the ED and states that her pain is controlled.  She agrees with outpatient follow-up with OB/GYN or  primary care physician.  She will return to emergency department for worsening symptoms.  She will continue OTC meds as needed for pain relief.    Amount and/or Complexity of Data Reviewed  Labs: ordered.  Radiology: ordered.    Risk  Prescription drug management.             Disposition  Final diagnoses:   Right ovarian cyst     Time reflects when diagnosis was documented in both MDM as applicable and the Disposition within this note       Time User Action Codes Description Comment    12/23/2023  4:38 PM Anna Benavidez [N83.201] Right ovarian cyst           ED Disposition       ED Disposition   Discharge    Condition   Stable    Date/Time   Sat Dec 23, 2023  4:38 PM    Comment   Patricia Redmond discharge to home/self care.                   Follow-up Information       Follow up With Specialties Details Why Contact Info Additional Information    Daniella Hall DO Family Medicine Schedule an appointment as soon as possible for a visit in 1 day for follow up [address] [facility] [address]  [facility] [address]  [phone]       [facility] [place] [facility] Schedule an appointment as soon as possible for a visit in 1 day for follow up [address] [phone]  [phone] [facility] [place], [address] [place] [address], [phone]         [phone]            Discharge Medication List as of 12/23/2023  5:00 PM        CONTINUE these medications which have NOT CHANGED    Details   acamprosate (CAMPRAL) 333 mg tablet Take 2 tablets (666 mg total) by mouth 3 (three) times a day, Starting Fri 7/7/2023, Normal             No discharge procedures on file.    PDMP Review       None            ED Provider  Electronically Signed by             Anna Benavidez DO  12/24/23 1930

## 2023-12-27 ENCOUNTER — OFFICE VISIT (OUTPATIENT)
Dept: FAMILY MEDICINE CLINIC | Facility: CLINIC | Age: 58
End: 2023-12-27
Payer: COMMERCIAL

## 2023-12-27 VITALS
HEART RATE: 100 BPM | SYSTOLIC BLOOD PRESSURE: 140 MMHG | HEIGHT: 67 IN | BODY MASS INDEX: 24.71 KG/M2 | WEIGHT: 157.4 LBS | TEMPERATURE: 97.5 F | DIASTOLIC BLOOD PRESSURE: 72 MMHG | RESPIRATION RATE: 16 BRPM

## 2023-12-27 DIAGNOSIS — Z79.899 ENCOUNTER FOR LONG-TERM CURRENT USE OF MEDICATION: ICD-10-CM

## 2023-12-27 DIAGNOSIS — F10.20 UNCOMPLICATED ALCOHOL DEPENDENCE (HCC): Primary | ICD-10-CM

## 2023-12-27 DIAGNOSIS — Z11.59 NEED FOR HEPATITIS C SCREENING TEST: ICD-10-CM

## 2023-12-27 DIAGNOSIS — N83.201 RIGHT OVARIAN CYST: ICD-10-CM

## 2023-12-27 DIAGNOSIS — Z13.6 SCREENING FOR CARDIOVASCULAR CONDITION: ICD-10-CM

## 2023-12-27 PROCEDURE — 99213 OFFICE O/P EST LOW 20 MIN: CPT | Performed by: FAMILY MEDICINE

## 2023-12-27 NOTE — PROGRESS NOTES
Name: Patricia Redmond      : 1965      MRN: [id number]  Encounter Provider: Daniella Hall DO  Encounter Date: 2023   Encounter department: [facility]    Assessment & Plan     1. Uncomplicated alcohol dependence (HCC)  Assessment & Plan:  She is going to start the medication after the holidays      2. Right ovarian cyst  Comments:  reviewed ultrasound done in ER  will follow up with gyn in February  Orders:  -     TSH, 3rd generation; Future  -     TSH, 3rd generation    3. Screening for cardiovascular condition  -     Comprehensive metabolic panel; Future  -     Lipid Panel with Direct LDL reflex; Future  -     Comprehensive metabolic panel  -     Lipid Panel with Direct LDL reflex    4. Need for hepatitis C screening test  -     Hepatitis C antibody; Future  -     Hepatitis C antibody    5. Encounter for long-term current use of medication  -     TSH, 3rd generation; Future  -     TSH, 3rd generation     Return for Annual physical in July.    Subjective      She was having right lower quadrant pain.     Abdominal Pain  This is a new problem. The current episode started in the past 7 days. The onset quality is gradual. The problem occurs intermittently. The problem has been gradually improving. The pain is located in the right flank. The pain is at a severity of 3/10. The quality of the pain is sharp. The abdominal pain radiates to the back. Pertinent negatives include no anorexia, arthralgias, belching, constipation, diarrhea, dysuria, fever, flatus, frequency, headaches, hematochezia, hematuria, melena, myalgias, nausea, vomiting or weight loss.     Review of Systems   Constitutional:  Negative for fever and weight loss.   Gastrointestinal:  Positive for abdominal pain. Negative for anorexia, constipation, diarrhea, flatus, hematochezia, melena, nausea and vomiting.   Genitourinary:  Negative for dysuria, frequency and hematuria.   Musculoskeletal:  Negative for arthralgias and myalgias.  "  Neurological:  Negative for headaches.       Current Outpatient Medications on File Prior to Visit   Medication Sig   • acamprosate (CAMPRAL) 333 mg tablet Take 2 tablets (666 mg total) by mouth 3 (three) times a day (Patient not taking: Reported on 12/23/2023)       Objective     /72   Pulse 100   Temp 97.5 °F (36.4 °C)   Resp 16   Ht 5' 7\" (1.702 m)   Wt 71.4 kg (157 lb 6.4 oz)   BMI 24.65 kg/m²     Physical Exam  Vitals and nursing note reviewed.   Constitutional:       Appearance: She is well-developed.   HENT:      Head: Normocephalic and atraumatic.      Right Ear: Tympanic membrane and external ear normal.      Left Ear: Tympanic membrane and external ear normal.   Cardiovascular:      Rate and Rhythm: Normal rate and regular rhythm.      Heart sounds: Normal heart sounds. No murmur heard.     No friction rub.   Pulmonary:      Effort: Pulmonary effort is normal. No respiratory distress.      Breath sounds: Normal breath sounds. No wheezing or rales.   Musculoskeletal:      Right lower leg: No edema.      Left lower leg: No edema.       Daniella Hall, DO    "

## 2024-02-21 ENCOUNTER — OFFICE VISIT (OUTPATIENT)
Dept: FAMILY MEDICINE CLINIC | Facility: CLINIC | Age: 59
End: 2024-02-21
Payer: COMMERCIAL

## 2024-02-21 VITALS
DIASTOLIC BLOOD PRESSURE: 80 MMHG | HEART RATE: 108 BPM | SYSTOLIC BLOOD PRESSURE: 142 MMHG | TEMPERATURE: 98 F | RESPIRATION RATE: 16 BRPM | WEIGHT: 159 LBS | BODY MASS INDEX: 24.9 KG/M2

## 2024-02-21 DIAGNOSIS — J06.9 UPPER RESPIRATORY TRACT INFECTION, UNSPECIFIED TYPE: Primary | ICD-10-CM

## 2024-02-21 PROBLEM — Z11.59 SPECIAL SCREENING EXAMINATION FOR VIRAL DISEASE: Status: RESOLVED | Noted: 2020-11-20 | Resolved: 2024-02-21

## 2024-02-21 PROCEDURE — 99213 OFFICE O/P EST LOW 20 MIN: CPT | Performed by: FAMILY MEDICINE

## 2024-02-21 RX ORDER — BENZONATATE 200 MG/1
200 CAPSULE ORAL 3 TIMES DAILY PRN
Qty: 30 CAPSULE | Refills: 0 | Status: SHIPPED | OUTPATIENT
Start: 2024-02-21

## 2024-02-21 RX ORDER — METHYLPREDNISOLONE 4 MG/1
TABLET ORAL
Qty: 21 EACH | Refills: 0 | Status: SHIPPED | OUTPATIENT
Start: 2024-02-21

## 2024-02-21 NOTE — PROGRESS NOTES
Assessment/Plan:    1. Upper respiratory tract infection, unspecified type  -     methylPREDNISolone 4 MG tablet therapy pack; Use as directed on package  -     benzonatate (TESSALON) 200 MG capsule; Take 1 capsule (200 mg total) by mouth 3 (three) times a day as needed for cough    Pt has augmentin at home can continue  I mirza;l; give medrol  As well as tessalon        There are no Patient Instructions on file for this visit.    No follow-ups on file.    Subjective:      Patient ID: Patricia Redmond is a 58 y.o. female.    Chief Complaint   Patient presents with   • Cough   • Nasal Congestion     SX Thursday                 Sas/cma       Started last weel scratchy thropat runnuy nose  Congested  Went to chest  Had amoxicillin at home started it 4 days ago      Cough  This is a new problem. The current episode started in the past 7 days. The problem has been waxing and waning. The problem occurs every few hours. The cough is Productive of purulent sputum. Associated symptoms include chills, nasal congestion and a sore throat. Pertinent negatives include no chest pain, ear congestion, ear pain, fever, headaches, heartburn, hemoptysis, myalgias, postnasal drip, rash, rhinorrhea, shortness of breath, sweats, weight loss or wheezing. Nothing aggravates the symptoms.       The following portions of the patient's history were reviewed and updated as appropriate: allergies, current medications, past family history, past medical history, past social history, past surgical history and problem list.    Review of Systems   Constitutional:  Positive for chills. Negative for fever and weight loss.   HENT:  Positive for sore throat. Negative for ear pain, postnasal drip and rhinorrhea.    Respiratory:  Positive for cough. Negative for hemoptysis, shortness of breath and wheezing.    Cardiovascular:  Negative for chest pain.   Gastrointestinal:  Negative for heartburn.   Musculoskeletal:  Negative for myalgias.   Skin:  Negative for  rash.   Neurological:  Negative for headaches.         Current Outpatient Medications   Medication Sig Dispense Refill   • benzonatate (TESSALON) 200 MG capsule Take 1 capsule (200 mg total) by mouth 3 (three) times a day as needed for cough 30 capsule 0   • methylPREDNISolone 4 MG tablet therapy pack Use as directed on package 21 each 0   • Multiple Vitamins-Minerals (Multi Complete) CAPS Take by mouth     • acamprosate (CAMPRAL) 333 mg tablet Take 2 tablets (666 mg total) by mouth 3 (three) times a day (Patient not taking: Reported on 12/23/2023) 180 tablet 5     No current facility-administered medications for this visit.       Objective:    /80   Pulse (!) 108   Temp 98 °F (36.7 °C)   Resp 16   Wt 72.1 kg (159 lb)   BMI 24.90 kg/m²        Physical Exam  HENT:      Nose: Congestion and rhinorrhea present.                Frank Lombardi, DO

## 2024-09-23 ENCOUNTER — OFFICE VISIT (OUTPATIENT)
Dept: FAMILY MEDICINE CLINIC | Facility: CLINIC | Age: 59
End: 2024-09-23
Payer: COMMERCIAL

## 2024-09-23 VITALS
WEIGHT: 157.2 LBS | BODY MASS INDEX: 24.67 KG/M2 | TEMPERATURE: 98.6 F | DIASTOLIC BLOOD PRESSURE: 84 MMHG | HEART RATE: 104 BPM | SYSTOLIC BLOOD PRESSURE: 142 MMHG | HEIGHT: 67 IN | RESPIRATION RATE: 16 BRPM

## 2024-09-23 DIAGNOSIS — J01.00 ACUTE NON-RECURRENT MAXILLARY SINUSITIS: Primary | ICD-10-CM

## 2024-09-23 PROCEDURE — 99213 OFFICE O/P EST LOW 20 MIN: CPT | Performed by: FAMILY MEDICINE

## 2024-09-23 RX ORDER — AZITHROMYCIN 250 MG/1
TABLET, FILM COATED ORAL
Qty: 6 TABLET | Refills: 0 | Status: SHIPPED | OUTPATIENT
Start: 2024-09-23 | End: 2024-09-28

## 2024-09-23 NOTE — PROGRESS NOTES
Assessment/Plan:    1. Acute non-recurrent maxillary sinusitis  -     azithromycin (ZITHROMAX) 250 mg tablet; 2 tabs on day 1, 1 tab a day for 4 days after          There are no Patient Instructions on file for this visit.    No follow-ups on file.    Subjective:      Patient ID: Patricia Redmond is a 59 y.o. female.    Chief Complaint   Patient presents with    Cough     COUGH starting last Thursday. CMA         pT STATES Shjust got back from a cruis  PT states she strated feeling sick 4 days ago  Cough Tight chest  Scratchy throat  No fever no chills    Cough  This is a new problem. The current episode started in the past 7 days. The problem has been gradually worsening. The problem occurs every few minutes. The cough is Productive of sputum. Associated symptoms include chest pain, nasal congestion, rhinorrhea and a sore throat. Pertinent negatives include no chills, ear congestion, ear pain, fever, headaches, heartburn, hemoptysis, myalgias, postnasal drip, rash, shortness of breath, sweats, weight loss or wheezing.       The following portions of the patient's history were reviewed and updated as appropriate: allergies, current medications, past family history, past medical history, past social history, past surgical history and problem list.    Review of Systems   Constitutional:  Negative for chills, fever and weight loss.   HENT:  Positive for rhinorrhea and sore throat. Negative for ear pain and postnasal drip.    Respiratory:  Positive for cough. Negative for hemoptysis, shortness of breath and wheezing.    Cardiovascular:  Positive for chest pain.   Gastrointestinal:  Negative for heartburn.   Musculoskeletal:  Negative for myalgias.   Skin:  Negative for rash.   Neurological:  Negative for headaches.         Current Outpatient Medications   Medication Sig Dispense Refill    azithromycin (ZITHROMAX) 250 mg tablet 2 tabs on day 1, 1 tab a day for 4 days after 6 tablet 0    acamprosate (CAMPRAL) 333 mg tablet  "Take 2 tablets (666 mg total) by mouth 3 (three) times a day (Patient not taking: Reported on 12/23/2023) 180 tablet 5    benzonatate (TESSALON) 200 MG capsule Take 1 capsule (200 mg total) by mouth 3 (three) times a day as needed for cough (Patient not taking: Reported on 9/23/2024) 30 capsule 0    methylPREDNISolone 4 MG tablet therapy pack Use as directed on package (Patient not taking: Reported on 9/23/2024) 21 each 0    Multiple Vitamins-Minerals (Multi Complete) CAPS Take by mouth (Patient not taking: Reported on 9/23/2024)       No current facility-administered medications for this visit.       Objective:    /84   Pulse 104   Temp 98.6 °F (37 °C) (Tympanic)   Resp 16   Ht 5' 7\" (1.702 m)   Wt 71.3 kg (157 lb 3.2 oz)   BMI 24.62 kg/m²        Physical Exam  HENT:      Nose: Congestion and rhinorrhea present.                Frank Lombardi,   "

## 2024-12-14 ENCOUNTER — HOSPITAL ENCOUNTER (EMERGENCY)
Facility: HOSPITAL | Age: 59
Discharge: HOME | End: 2024-12-14
Attending: EMERGENCY MEDICINE
Payer: COMMERCIAL

## 2024-12-14 VITALS
OXYGEN SATURATION: 96 % | SYSTOLIC BLOOD PRESSURE: 171 MMHG | WEIGHT: 159.2 LBS | RESPIRATION RATE: 18 BRPM | BODY MASS INDEX: 24.93 KG/M2 | DIASTOLIC BLOOD PRESSURE: 108 MMHG | TEMPERATURE: 97.6 F | HEART RATE: 104 BPM

## 2024-12-14 DIAGNOSIS — S61.011A LACERATION OF RIGHT THUMB: Primary | ICD-10-CM

## 2024-12-14 PROCEDURE — 12001 RPR S/N/AX/GEN/TRNK 2.5CM/<: CPT | Performed by: PHYSICIAN ASSISTANT

## 2024-12-14 PROCEDURE — 99284 EMERGENCY DEPT VISIT MOD MDM: CPT | Performed by: PHYSICIAN ASSISTANT

## 2024-12-14 PROCEDURE — 99282 EMERGENCY DEPT VISIT SF MDM: CPT

## 2024-12-14 RX ORDER — LIDOCAINE HYDROCHLORIDE 10 MG/ML
10 INJECTION, SOLUTION EPIDURAL; INFILTRATION; INTRACAUDAL; PERINEURAL ONCE
Status: COMPLETED | OUTPATIENT
Start: 2024-12-14 | End: 2024-12-14

## 2024-12-14 RX ORDER — GINSENG 100 MG
1 CAPSULE ORAL ONCE
Status: COMPLETED | OUTPATIENT
Start: 2024-12-14 | End: 2024-12-14

## 2024-12-14 RX ADMIN — BACITRACIN ZINC 1 LARGE APPLICATION: 500 OINTMENT TOPICAL at 12:28

## 2024-12-14 RX ADMIN — LIDOCAINE HYDROCHLORIDE 10 ML: 10 INJECTION, SOLUTION EPIDURAL; INFILTRATION; INTRACAUDAL; PERINEURAL at 12:28

## 2024-12-14 NOTE — ED PROVIDER NOTES
Time reflects when diagnosis was documented in both MDM as applicable and the Disposition within this note       Time User Action Codes Description Comment    12/14/2024 12:54 PM Nataliyagiulianotyler Eric Add [S61.011A] Laceration of right thumb           ED Disposition       ED Disposition   Discharge    Condition   Stable    Date/Time   Sat Dec 14, 2024 12:54 PM    Comment   Patricia Redmond discharge to home/self care.                   Assessment & Plan       Medical Decision Making  59-year-old white female presenting with right thumb laceration sustained on a mandolin slicer.  Right thumb was digitally blocked with 1% plain lidocaine.  Wound was irrigated with sterile saline.  Prepped and draped in usual sterile fashion with chlorhexidine.  Closed with 5 x 4 0 Ethilon sutures.  Bacitracin and dry sterile dressing applied.  Wound care instructions given.  Suture removal in 10 days at primary care provider advised.  Return precautions were reviewed including signs of wound infection    Risk  OTC drugs.  Prescription drug management.             Medications   lidocaine (PF) (XYLOCAINE-MPF) 1 % injection 10 mL (10 mL Infiltration Given 12/14/24 1228)   bacitracin topical ointment 1 large application (1 large application Topical Given 12/14/24 1228)       ED Risk Strat Scores                          SBIRT 20yo+      Flowsheet Row Most Recent Value   Initial Alcohol Screen: US AUDIT-C     1. How often do you have a drink containing alcohol? 3 Filed at: 12/14/2024 1201   2. How many drinks containing alcohol do you have on a typical day you are drinking?  2 Filed at: 12/14/2024 1201   3b. FEMALE Any Age, or MALE 65+: How often do you have 4 or more drinks on one occassion? 0 Filed at: 12/14/2024 1201   Audit-C Score 5 Filed at: 12/14/2024 1201   GILDA: How many times in the past year have you...    Used an illegal drug or used a prescription medication for non-medical reasons? Never Filed at: 12/14/2024 1201                             History of Present Illness       Chief Complaint   Patient presents with    Finger Laceration     States she cut her right thumb on mandolin while slicing onions.        History reviewed. No pertinent past medical history.   Past Surgical History:   Procedure Laterality Date    ELBOW FRACTURE REPAIR Left 8/13/2016    Procedure: OPEN REDUCTION W/ INTERNAL FIXATION (ORIF) ELBOW;  Surgeon: Venecia Cabezas MD;  Location: [facility];  Service:     FINGER DEBRIDEMENT      HAND DEBRIDEMENT Left 2/26/2017    Procedure: Arthrotomy, I/D left elblow;  Surgeon: Richar Arroyo MD;  Location: [facility];  Service:     ORIF HUMERUS FRACTURE Left 8/13/2016    Procedure: OPEN REDUCTION W/ INTERNAL FIXATION (ORIF) HUMERUS MIDSHAFT / DISTAL;  Surgeon: Venecia Cabezas MD;  Location: [facility];  Service:     ORIF WRIST FRACTURE Left 8/13/2016    Procedure: OPEN REDUCTION W/ INTERNAL FIXATION (ORIF) RADIUS / ULNA (WRIST);  Surgeon: Venecia Cabezas MD;  Location: [facility];  Service:     TONSILECTOMY AND ADNOIDECTOMY      WOUND DEBRIDEMENT Left 8/17/2016    Procedure: DEBRIDEMENT UPPER EXTREMITY (WASH OUT) AND WOUND CLOSURE;  Surgeon: Laron Quispe MD;  Location: [facility];  Service:     WOUND DEBRIDEMENT Left 8/15/2016    Procedure: DEBRIDEMENT WOUND AND DRESSING CHANGE (WASH OUT). Left forearm;  Surgeon: Venecia Cabezas MD;  Location: [facility];  Service:     WOUND DEBRIDEMENT Left 8/13/2016    Procedure: DEBRIDEMENT WOUND (WASH OUT);  Surgeon: Venecia Cabezas MD;  Location: [facility];  Service:       Family History   Problem Relation Age of Onset    Autoimmune disease Mother     Stomach cancer Father     Liver disease Father     Diabetes Father     Lupus Sister     Diabetes Brother     Rheum arthritis Sister     Bipolar disorder Sister     Breast cancer Paternal Grandmother 80      Social History     Tobacco Use    Smoking status: Former     Current packs/day: 0.00     Average packs/day: 1 pack/day for  22.0 years (22.0 ttl pk-yrs)     Types: Cigarettes     Start date:      Quit date:      Years since quittin.9    Smokeless tobacco: Never   Vaping Use    Vaping status: Never Used   Substance Use Topics    Alcohol use: Yes     Alcohol/week: 6.0 standard drinks of alcohol     Types: 6 Cans of beer per week     Comment: social    Drug use: Never      E-Cigarette/Vaping    E-Cigarette Use Never User       E-Cigarette/Vaping Substances    Nicotine No     THC No     CBD No     Flavoring No     Other No     Unknown No       I have reviewed and agree with the history as documented.     Patient is a 58 yo wf who reports R thumb laceration occurring 1 hour prior to arrival when she cut on mandolin slicer. No numbness, tingling, weakness. States last tetanus was . No other complaints         Review of Systems   Skin:  Positive for wound.   Neurological:  Negative for numbness.           Objective       ED Triage Vitals [24 1158]   Temperature Pulse Blood Pressure Respirations SpO2 Patient Position - Orthostatic VS   97.6 °F (36.4 °C) 104 (!) 171/108 18 96 % Sitting      Temp Source Heart Rate Source BP Location FiO2 (%) Pain Score    Tympanic Monitor Left arm -- 4      Vitals      Date and Time Temp Pulse SpO2 Resp BP Pain Score FACES Pain Rating User   24 1158 97.6 °F (36.4 °C) 104 96 % 18 171/108 4 -- SW            Physical Exam  Vitals and nursing note reviewed.   Constitutional:       General: She is not in acute distress.     Appearance: Normal appearance. She is not ill-appearing, toxic-appearing or diaphoretic.   Musculoskeletal:      Comments: 2.5 cm flap laceration to R thumb. Thumb is nvi    Skin:     General: Skin is warm and dry.      Capillary Refill: Capillary refill takes less than 2 seconds.   Neurological:      Mental Status: She is alert.         Results Reviewed       None            No orders to display       Universal Protocol:  procedure performed by consultantConsent: Verbal  "consent obtained.  Consent given by: patient  Time out: Immediately prior to procedure a \"time out\" was called to verify the correct patient, procedure, equipment, support staff and site/side marked as required.  Timeout called at: 12/14/2024 12:40 PM.  Patient understanding: patient states understanding of the procedure being performed  Patient consent: the patient's understanding of the procedure matches consent given  Procedure consent: procedure consent matches procedure scheduled  Relevant documents: relevant documents present and verified  Test results: test results available and properly labeled  Site marked: the operative site was marked  Radiology Images displayed and confirmed. If images not available, report reviewed: imaging studies available  Patient identity confirmed: verbally with patient and arm band  Laceration repair    Date/Time: 12/14/2024 12:40 PM    Performed by: Eric Dumont PA-C  Authorized by: Eric Dumont PA-C  Body area: upper extremity  Location details: right thumb  Laceration length: 2.5 cm  Foreign bodies: no foreign bodies  Tendon involvement: none  Nerve involvement: none  Vascular damage: no  Anesthesia: digital block    Anesthesia:  Local Anesthetic: lidocaine 1% without epinephrine  Anesthetic total: 3 mL    Sedation:  Patient sedated: no      Wound Dehiscence:  Superficial Wound Dehiscence: simple closure      Procedure Details:  Preparation: Patient was prepped and draped in the usual sterile fashion.  Irrigation solution: saline  Irrigation method: syringe  Amount of cleaning: standard  Debridement: none  Degree of undermining: none  Skin closure: 4-0 nylon  Number of sutures: 5  Technique: simple  Approximation: close  Approximation difficulty: simple  Dressing: antibiotic ointment and gauze roll  Patient tolerance: patient tolerated the procedure well with no immediate complications          ED Medication and Procedure Management   Prior to Admission " Medications   Prescriptions Last Dose Informant Patient Reported? Taking?   acamprosate (CAMPRAL) 333 mg tablet   No No   Sig: Take 2 tablets (666 mg total) by mouth 3 (three) times a day   Patient not taking: Reported on 12/23/2023      Facility-Administered Medications: None     Discharge Medication List as of 12/14/2024 12:55 PM        CONTINUE these medications which have NOT CHANGED    Details   acamprosate (CAMPRAL) 333 mg tablet Take 2 tablets (666 mg total) by mouth 3 (three) times a day, Starting Fri 7/7/2023, Normal           No discharge procedures on file.  ED SEPSIS DOCUMENTATION   Time reflects when diagnosis was documented in both MDM as applicable and the Disposition within this note       Time User Action Codes Description Comment    12/14/2024 12:54 PM Eric Dumont Add [S61.011A] Laceration of right thumb                  Eric Dumont PA-C  12/14/24 1634

## 2024-12-14 NOTE — DISCHARGE INSTRUCTIONS
Keep sutures dry when bathing    Topical bacitracin ointment and new dressing daily    Suture removal 10 days at your primary care provider    Return to ED for signs of wound infection

## 2024-12-23 ENCOUNTER — OFFICE VISIT (OUTPATIENT)
Dept: FAMILY MEDICINE CLINIC | Facility: CLINIC | Age: 59
End: 2024-12-23
Payer: COMMERCIAL

## 2024-12-23 VITALS
BODY MASS INDEX: 24.96 KG/M2 | HEIGHT: 67 IN | WEIGHT: 159 LBS | DIASTOLIC BLOOD PRESSURE: 84 MMHG | HEART RATE: 96 BPM | RESPIRATION RATE: 16 BRPM | TEMPERATURE: 97.9 F | SYSTOLIC BLOOD PRESSURE: 132 MMHG

## 2024-12-23 DIAGNOSIS — S61.011S LACERATION OF RIGHT THUMB WITHOUT FOREIGN BODY WITHOUT DAMAGE TO NAIL, SEQUELA: Primary | ICD-10-CM

## 2024-12-23 DIAGNOSIS — Z48.02 VISIT FOR SUTURE REMOVAL: ICD-10-CM

## 2024-12-23 PROBLEM — F10.20 ALCOHOL DEPENDENCE (HCC): Status: RESOLVED | Noted: 2023-07-07 | Resolved: 2024-12-23

## 2024-12-23 PROCEDURE — 99214 OFFICE O/P EST MOD 30 MIN: CPT | Performed by: FAMILY MEDICINE

## 2024-12-23 RX ORDER — CYCLOSPORINE 0.5 MG/ML
EMULSION OPHTHALMIC
COMMUNITY
Start: 2024-11-22

## 2024-12-23 NOTE — PROGRESS NOTES
"Name: Patricia Redmond      : 1965      MRN: [id number]  Encounter Provider: Zena Rodriguez MD  Encounter Date: 2024   Encounter department: [facility]  :  Assessment & Plan  Laceration of right thumb without foreign body without damage to nail, sequela         Visit for suture removal         Suture removal    Date/Time: 2024 12:15 PM    Performed by: Zena Rodriguez MD  Authorized by: Zena Rodriguez MD      Location:     Laterality:  Right    Location: thumb.  Procedure details:     Tools used:  Scissors    Wound appearance:  No sign(s) of infection, good wound healing and clean    Number of sutures removed:  5  Post-procedure details:     Post-removal:  Antibiotic ointment applied and Band-Aid applied    Patient tolerance of procedure:  Tolerated well, no immediate complications           History of Present Illness     HPI  She is here today for suture removal. Seen at [facility] ER on 24 for laceration of right thumb after cutting it while using a mandolin slicer.   She  had 5 sutures placed.   No redness, swelling, drainage, bleeding.   ROM intact.   On Keflex.    Review of Systems   Constitutional: Negative.    HENT: Negative.     Eyes: Negative.    Respiratory: Negative.     Cardiovascular: Negative.    Gastrointestinal: Negative.    Endocrine: Negative.    Genitourinary: Negative.    Musculoskeletal: Negative.    Skin: Negative.    Allergic/Immunologic: Negative.    Neurological: Negative.    Hematological: Negative.    Psychiatric/Behavioral: Negative.         Objective   /84   Pulse 96   Temp 97.9 °F (36.6 °C)   Resp 16   Ht 5' 7\" (1.702 m)   Wt 72.1 kg (159 lb)   BMI 24.90 kg/m²      Physical Exam  Constitutional:       General: She is not in acute distress.     Appearance: Normal appearance. She is not ill-appearing, toxic-appearing or diaphoretic.   HENT:      Head: Normocephalic and atraumatic.   Eyes:      General:         Right eye: No discharge.         " Left eye: No discharge.      Conjunctiva/sclera: Conjunctivae normal.   Pulmonary:      Effort: Pulmonary effort is normal.   Skin:     Comments: Right thumb lacerations with 5 sutures in place. No evidence of bleeding or infection. ROM intact.    Neurological:      Mental Status: She is alert.   Psychiatric:         Mood and Affect: Mood normal.         Behavior: Behavior normal.         Thought Content: Thought content normal.         Judgment: Judgment normal.

## 2025-02-18 ENCOUNTER — APPOINTMENT (EMERGENCY)
Dept: RADIOLOGY | Facility: HOSPITAL | Age: 60
End: 2025-02-18
Payer: COMMERCIAL

## 2025-02-18 ENCOUNTER — HOSPITAL ENCOUNTER (EMERGENCY)
Facility: HOSPITAL | Age: 60
Discharge: HOME | End: 2025-02-18
Attending: EMERGENCY MEDICINE | Admitting: EMERGENCY MEDICINE
Payer: COMMERCIAL

## 2025-02-18 VITALS
RESPIRATION RATE: 20 BRPM | HEIGHT: 66 IN | WEIGHT: 160 LBS | BODY MASS INDEX: 25.71 KG/M2 | SYSTOLIC BLOOD PRESSURE: 173 MMHG | TEMPERATURE: 97.7 F | DIASTOLIC BLOOD PRESSURE: 100 MMHG | OXYGEN SATURATION: 97 % | HEART RATE: 87 BPM

## 2025-02-18 DIAGNOSIS — R10.9 ABDOMINAL PAIN: Primary | ICD-10-CM

## 2025-02-18 LAB
ALBUMIN SERPL BCG-MCNC: 4.7 G/DL (ref 3.5–5)
ALP SERPL-CCNC: 82 U/L (ref 34–104)
ALT SERPL W P-5'-P-CCNC: 19 U/L (ref 7–52)
ANION GAP SERPL CALCULATED.3IONS-SCNC: 13 MMOL/L (ref 4–13)
AST SERPL W P-5'-P-CCNC: 20 U/L (ref 13–39)
BACTERIA UR QL AUTO: ABNORMAL /HPF
BASOPHILS # BLD AUTO: 0.04 THOUSANDS/ΜL (ref 0–0.1)
BASOPHILS NFR BLD AUTO: 1 % (ref 0–1)
BILIRUB SERPL-MCNC: 0.72 MG/DL (ref 0.2–1)
BILIRUB UR QL STRIP: NEGATIVE
BUN SERPL-MCNC: 14 MG/DL (ref 5–25)
CALCIUM SERPL-MCNC: 9.8 MG/DL (ref 8.4–10.2)
CHLORIDE SERPL-SCNC: 103 MMOL/L (ref 96–108)
CLARITY UR: CLEAR
CO2 SERPL-SCNC: 22 MMOL/L (ref 21–32)
COLOR UR: YELLOW
CREAT SERPL-MCNC: 0.64 MG/DL (ref 0.6–1.3)
EOSINOPHIL # BLD AUTO: 0.12 THOUSAND/ΜL (ref 0–0.61)
EOSINOPHIL NFR BLD AUTO: 2 % (ref 0–6)
ERYTHROCYTE [DISTWIDTH] IN BLOOD BY AUTOMATED COUNT: 11.9 % (ref 11.6–15.1)
GFR SERPL CREATININE-BSD FRML MDRD: 97 ML/MIN/1.73SQ M
GLUCOSE SERPL-MCNC: 159 MG/DL (ref 65–140)
GLUCOSE UR STRIP-MCNC: NEGATIVE MG/DL
HCT VFR BLD AUTO: 44.9 % (ref 34.8–46.1)
HGB BLD-MCNC: 14.9 G/DL (ref 11.5–15.4)
HGB UR QL STRIP.AUTO: NEGATIVE
HYALINE CASTS #/AREA URNS LPF: ABNORMAL /LPF
IMM GRANULOCYTES # BLD AUTO: 0.01 THOUSAND/UL (ref 0–0.2)
IMM GRANULOCYTES NFR BLD AUTO: 0 % (ref 0–2)
KETONES UR STRIP-MCNC: NEGATIVE MG/DL
LEUKOCYTE ESTERASE UR QL STRIP: NEGATIVE
LYMPHOCYTES # BLD AUTO: 2.09 THOUSANDS/ΜL (ref 0.6–4.47)
LYMPHOCYTES NFR BLD AUTO: 32 % (ref 14–44)
MCH RBC QN AUTO: 29.6 PG (ref 26.8–34.3)
MCHC RBC AUTO-ENTMCNC: 33.2 G/DL (ref 31.4–37.4)
MCV RBC AUTO: 89 FL (ref 82–98)
MONOCYTES # BLD AUTO: 0.55 THOUSAND/ΜL (ref 0.17–1.22)
MONOCYTES NFR BLD AUTO: 9 % (ref 4–12)
NEUTROPHILS # BLD AUTO: 3.69 THOUSANDS/ΜL (ref 1.85–7.62)
NEUTS SEG NFR BLD AUTO: 56 % (ref 43–75)
NITRITE UR QL STRIP: NEGATIVE
NON-SQ EPI CELLS URNS QL MICRO: ABNORMAL /HPF
NRBC BLD AUTO-RTO: 0 /100 WBCS
PH UR STRIP.AUTO: 6.5 [PH]
PLATELET # BLD AUTO: 253 THOUSANDS/UL (ref 149–390)
PMV BLD AUTO: 9.7 FL (ref 8.9–12.7)
POTASSIUM SERPL-SCNC: 4 MMOL/L (ref 3.5–5.3)
PROT SERPL-MCNC: 7.6 G/DL (ref 6.4–8.4)
PROT UR STRIP-MCNC: ABNORMAL MG/DL
RBC # BLD AUTO: 5.04 MILLION/UL (ref 3.81–5.12)
RBC #/AREA URNS AUTO: ABNORMAL /HPF
SODIUM SERPL-SCNC: 138 MMOL/L (ref 135–147)
SP GR UR STRIP.AUTO: 1.01 (ref 1–1.03)
UROBILINOGEN UR STRIP-ACNC: <2 MG/DL
WBC # BLD AUTO: 6.5 THOUSAND/UL (ref 4.31–10.16)
WBC #/AREA URNS AUTO: ABNORMAL /HPF

## 2025-02-18 PROCEDURE — 76830 TRANSVAGINAL US NON-OB: CPT

## 2025-02-18 PROCEDURE — 80053 COMPREHEN METABOLIC PANEL: CPT | Performed by: EMERGENCY MEDICINE

## 2025-02-18 PROCEDURE — 99285 EMERGENCY DEPT VISIT HI MDM: CPT | Performed by: EMERGENCY MEDICINE

## 2025-02-18 PROCEDURE — 81001 URINALYSIS AUTO W/SCOPE: CPT | Performed by: EMERGENCY MEDICINE

## 2025-02-18 PROCEDURE — 96375 TX/PRO/DX INJ NEW DRUG ADDON: CPT

## 2025-02-18 PROCEDURE — 74177 CT ABD & PELVIS W/CONTRAST: CPT

## 2025-02-18 PROCEDURE — 36415 COLL VENOUS BLD VENIPUNCTURE: CPT | Performed by: EMERGENCY MEDICINE

## 2025-02-18 PROCEDURE — 76856 US EXAM PELVIC COMPLETE: CPT

## 2025-02-18 PROCEDURE — 99284 EMERGENCY DEPT VISIT MOD MDM: CPT

## 2025-02-18 PROCEDURE — 96374 THER/PROPH/DIAG INJ IV PUSH: CPT

## 2025-02-18 PROCEDURE — 85025 COMPLETE CBC W/AUTO DIFF WBC: CPT | Performed by: EMERGENCY MEDICINE

## 2025-02-18 RX ORDER — HYDROMORPHONE HCL/PF 1 MG/ML
0.5 SYRINGE (ML) INJECTION ONCE
Refills: 0 | Status: COMPLETED | OUTPATIENT
Start: 2025-02-18 | End: 2025-02-18

## 2025-02-18 RX ORDER — KETOROLAC TROMETHAMINE 30 MG/ML
15 INJECTION, SOLUTION INTRAMUSCULAR; INTRAVENOUS ONCE
Status: COMPLETED | OUTPATIENT
Start: 2025-02-18 | End: 2025-02-18

## 2025-02-18 RX ORDER — ACETAMINOPHEN 10 MG/ML
1000 INJECTION, SOLUTION INTRAVENOUS ONCE
Status: COMPLETED | OUTPATIENT
Start: 2025-02-18 | End: 2025-02-18

## 2025-02-18 RX ADMIN — KETOROLAC TROMETHAMINE 15 MG: 30 INJECTION, SOLUTION INTRAMUSCULAR; INTRAVENOUS at 11:43

## 2025-02-18 RX ADMIN — ACETAMINOPHEN 1000 MG: 1000 INJECTION, SOLUTION INTRAVENOUS at 11:44

## 2025-02-18 RX ADMIN — HYDROMORPHONE HYDROCHLORIDE 0.5 MG: 1 INJECTION, SOLUTION INTRAMUSCULAR; INTRAVENOUS; SUBCUTANEOUS at 11:44

## 2025-02-18 RX ADMIN — IOHEXOL 100 ML: 350 INJECTION, SOLUTION INTRAVENOUS at 12:51

## 2025-02-18 NOTE — ED PROVIDER NOTES
Time reflects when diagnosis was documented in both MDM as applicable and the Disposition within this note       Time User Action Codes Description Comment    2/18/2025  2:47 PM Tori Ahmadi Add [R10.9] Abdominal pain           ED Disposition       ED Disposition   Discharge    Condition   Stable    Date/Time   Tue Feb 18, 2025  2:47 PM    Comment   Patricia Marykenia discharge to home/self care.                   Assessment & Plan       Medical Decision Making  Pt is a 58yo F who presents with abdominal pain.     Differential diagnosis to include but not limited to appendicitis, ovarian torsion, nephrolithiasis, ruptured ovarian cyst, cystitis, pyelonephritis.  Will plan for labs, UA, and imaging.  Will treat symptomatically and reassess.  See ED course for results and details.    Plan to discharge pt with f/u to PCP and ObGyn. Discussed returning the ED with new or worsening of symptoms. Discussed use of over the counter medications as stated on the bottle as needed for pain. Pt expressed understanding of discharge instructions, return precautions, and medication instructions and is stable for discharge at this time. All questions were answered and pt was discharged without incident.           Amount and/or Complexity of Data Reviewed  Labs: ordered. Decision-making details documented in ED Course.  Radiology: ordered. Decision-making details documented in ED Course.    Risk  Prescription drug management.        ED Course as of 02/18/25 1648   Tue Feb 18, 2025   1155 CBC and differential  Reviewed and without actionable derangement.    1155 WBC: 6.50  WNL   1214 Comprehensive metabolic panel(!)  Reviewed and without actionable derangement.    1314 US pelvis complete w transvaginal  No acute findings to explain pt's pain. Will require outpt follow-up and repeat imaging at interval.    1402 CT abdomen pelvis with contrast  Distal esophagitis. Otherwise, no acute findings in the abdomen and pelvis.   1441 Leukocytes,  UA: Negative   1441 Nitrite, UA: Negative  No evidence of infection.    1441 Blood, UA: Negative   1445 Discussed with ObGyn who were in agreement with outpt follow-up.    1445 Pt reassessed and resting comfortably. Pt on watching videos on phone. Pt was able to ambulate to the bathroom without difficulty. Pt states pain tolerable currently. Pt made aware of all results and plan for DC with outpt follow-up. Pt became upset and stated that she 'couldn't go home like this because she couldn't walk'. Pt has ambulated in the ED. Discussed with pt that with no acute findings, no indication for admission. Pt continued to be upset but was agreeable.        Medications   HYDROmorphone (DILAUDID) injection 0.5 mg (0.5 mg Intravenous Given 2/18/25 1144)   acetaminophen (Ofirmev) injection 1,000 mg (0 mg Intravenous Stopped 2/18/25 1159)   ketorolac (TORADOL) injection 15 mg (15 mg Intravenous Given 2/18/25 1143)   iohexol (OMNIPAQUE) 350 MG/ML injection (MULTI-DOSE) 100 mL (100 mL Intravenous Given 2/18/25 1251)       ED Risk Strat Scores                            SBIRT 22yo+      Flowsheet Row Most Recent Value   Initial Alcohol Screen: US AUDIT-C     1. How often do you have a drink containing alcohol? 3 Filed at: 02/18/2025 1134   2. How many drinks containing alcohol do you have on a typical day you are drinking?  0 Filed at: 02/18/2025 1134   3b. FEMALE Any Age, or MALE 65+: How often do you have 4 or more drinks on one occassion? 0 Filed at: 02/18/2025 1134   Audit-C Score 3 Filed at: 02/18/2025 1134   GILDA: How many times in the past year have you...    Used an illegal drug or used a prescription medication for non-medical reasons? Never Filed at: 02/18/2025 1138                            History of Present Illness       Chief Complaint   Patient presents with    Flank Pain     Patient reports right sided flank pain for a few days, started to be intolerable and severe this am, patient unable to sit still during  triage         History reviewed. No pertinent past medical history.   Past Surgical History:   Procedure Laterality Date    ELBOW FRACTURE REPAIR Left 2016    Procedure: OPEN REDUCTION W/ INTERNAL FIXATION (ORIF) ELBOW;  Surgeon: Venecia Cabezas MD;  Location: [facility];  Service:     FINGER DEBRIDEMENT      HAND DEBRIDEMENT Left 2017    Procedure: Arthrotomy, I/D left elblow;  Surgeon: Richar Arroyo MD;  Location: [facility];  Service:     ORIF HUMERUS FRACTURE Left 2016    Procedure: OPEN REDUCTION W/ INTERNAL FIXATION (ORIF) HUMERUS MIDSHAFT / DISTAL;  Surgeon: Venecia Cabezas MD;  Location: [facility];  Service:     ORIF WRIST FRACTURE Left 2016    Procedure: OPEN REDUCTION W/ INTERNAL FIXATION (ORIF) RADIUS / ULNA (WRIST);  Surgeon: Venecia Cabezas MD;  Location: [facility];  Service:     TONSILECTOMY AND ADNOIDECTOMY      WOUND DEBRIDEMENT Left 2016    Procedure: DEBRIDEMENT UPPER EXTREMITY (WASH OUT) AND WOUND CLOSURE;  Surgeon: Laron Quispe MD;  Location: [facility];  Service:     WOUND DEBRIDEMENT Left 8/15/2016    Procedure: DEBRIDEMENT WOUND AND DRESSING CHANGE (WASH OUT). Left forearm;  Surgeon: Venecia Cabezas MD;  Location: [facility];  Service:     WOUND DEBRIDEMENT Left 2016    Procedure: DEBRIDEMENT WOUND (WASH OUT);  Surgeon: Venecia Cabezas MD;  Location: [facility];  Service:       Family History   Problem Relation Age of Onset    Autoimmune disease Mother     Stomach cancer Father     Liver disease Father     Diabetes Father     Lupus Sister     Diabetes Brother     Rheum arthritis Sister     Bipolar disorder Sister     Breast cancer Paternal Grandmother 80      Social History     Tobacco Use    Smoking status: Former     Current packs/day: 0.00     Average packs/day: 1 pack/day for 22.0 years (22.0 ttl pk-yrs)     Types: Cigarettes     Start date:      Quit date:      Years since quittin.1    Smokeless tobacco: Never   Vaping Use     Vaping status: Never Used   Substance Use Topics    Alcohol use: Yes     Alcohol/week: 6.0 standard drinks of alcohol     Types: 6 Cans of beer per week     Comment: social    Drug use: Never      E-Cigarette/Vaping    E-Cigarette Use Never User       E-Cigarette/Vaping Substances    Nicotine No     THC No     CBD No     Flavoring No     Other No     Unknown No       I have reviewed and agree with the history as documented.     Pt is a 60yo F who presents for abdominal pain. Patient reports that for the past two days she has been having a dull right lower quadrant pain.  Patient states that today it felt like something 'ruptured' and the pain became severe. Pt reports the pain is unbearable and she was unable to walk secondary to it. Pt states that she has never had pain like this before. She does report that she has had the dull pain before and was diagnosed with an ovarian cyst but states that resolved and did not recur until recently. Pt denies vomiting but does report some nausea.           Objective       ED Triage Vitals   Temperature Pulse Blood Pressure Respirations SpO2 Patient Position - Orthostatic VS   02/18/25 1114 02/18/25 1114 02/18/25 1114 02/18/25 1114 02/18/25 1114 02/18/25 1114   97.7 °F (36.5 °C) 87 (!) 173/100 20 97 % Sitting      Temp Source Heart Rate Source BP Location FiO2 (%) Pain Score    02/18/25 1114 02/18/25 1114 02/18/25 1114 -- 02/18/25 1143    Temporal Monitor Left arm  10 - Worst Possible Pain      Vitals      Date and Time Temp Pulse SpO2 Resp BP Pain Score FACES Pain Rating User   02/18/25 1339 -- -- -- -- -- 6 -- KR   02/18/25 1213 -- -- -- -- -- 5 -- KR   02/18/25 1143 -- -- -- -- -- 10 - Worst Possible Pain -- KR   02/18/25 1114 97.7 °F (36.5 °C) 87 97 % 20 173/100 -- -- VG            Physical Exam  Vitals reviewed.   Constitutional:       General: She is in acute distress (2/2 pain).      Appearance: She is well-developed. She is not toxic-appearing or diaphoretic.   HENT:       Head: Normocephalic and atraumatic.      Right Ear: External ear normal.      Left Ear: External ear normal.      Nose: Nose normal.      Mouth/Throat:      Pharynx: Oropharynx is clear.   Eyes:      Extraocular Movements: Extraocular movements intact.      Pupils: Pupils are equal, round, and reactive to light.   Cardiovascular:      Rate and Rhythm: Normal rate and regular rhythm.      Heart sounds: Normal heart sounds. No murmur heard.  Pulmonary:      Effort: Pulmonary effort is normal. No respiratory distress.      Breath sounds: Normal breath sounds. No wheezing.   Abdominal:      General: There is no distension.      Palpations: Abdomen is soft.      Tenderness: There is abdominal tenderness (RLQ).   Musculoskeletal:         General: Normal range of motion.      Cervical back: Normal range of motion and neck supple.      Right lower leg: No edema.      Left lower leg: No edema.   Skin:     General: Skin is warm and dry.      Capillary Refill: Capillary refill takes less than 2 seconds.      Coloration: Skin is not pale.      Findings: No erythema or rash.   Neurological:      General: No focal deficit present.      Mental Status: She is alert and oriented to person, place, and time.   Psychiatric:         Speech: Speech normal.         Behavior: Behavior is cooperative.         Results Reviewed       Procedure Component Value Units Date/Time    Urine Microscopic [[id number]]  (Abnormal) Collected: 02/18/25 1432    Lab Status: Final result Specimen: Urine, Clean Catch Updated: 02/18/25 1510     RBC, UA 1-2 /hpf      WBC, UA 1-2 /hpf      Epithelial Cells Occasional /hpf      Bacteria, UA Occasional /hpf      Hyaline Casts, UA 0-1 /lpf     UA (URINE) with reflex to Scope [[id number]]  (Abnormal) Collected: 02/18/25 1432    Lab Status: Final result Specimen: Urine, Clean Catch Updated: 02/18/25 1438     Color, UA Yellow     Clarity, UA Clear     Specific Gravity, UA 1.010     pH, UA 6.5     Leukocytes, UA  Negative     Nitrite, UA Negative     Protein, UA 30 (1+) mg/dl      Glucose, UA Negative mg/dl      Ketones, UA Negative mg/dl      Urobilinogen, UA <2.0 mg/dl      Bilirubin, UA Negative     Occult Blood, UA Negative    Comprehensive metabolic panel [[id number]]  (Abnormal) Collected: 02/18/25 1147    Lab Status: Final result Specimen: Blood from Arm, Left Updated: 02/18/25 1213     Sodium 138 mmol/L      Potassium 4.0 mmol/L      Chloride 103 mmol/L      CO2 22 mmol/L      ANION GAP 13 mmol/L      BUN 14 mg/dL      Creatinine 0.64 mg/dL      Glucose 159 mg/dL      Calcium 9.8 mg/dL      AST 20 U/L      ALT 19 U/L      Alkaline Phosphatase 82 U/L      Total Protein 7.6 g/dL      Albumin 4.7 g/dL      Total Bilirubin 0.72 mg/dL      eGFR 97 ml/min/1.73sq m     Narrative:      National Kid[employer] guidelines for Chronic Kidney Disease (CKD):     Stage 1 with normal or high GFR (GFR > 90 mL/min/1.73 square meters)    Stage 2 Mild CKD (GFR = 60-89 mL/min/1.73 square meters)    Stage 3A Moderate CKD (GFR = 45-59 mL/min/1.73 square meters)    Stage 3B Moderate CKD (GFR = 30-44 mL/min/1.73 square meters)    Stage 4 Severe CKD (GFR = 15-29 mL/min/1.73 square meters)    Stage 5 End Stage CKD (GFR <15 mL/min/1.73 square meters)  Note: GFR calculation is accurate only with a steady state creatinine    CBC and differential [[id number]] Collected: 02/18/25 1147    Lab Status: Final result Specimen: Blood from Arm, Left Updated: 02/18/25 1152     WBC 6.50 Thousand/uL      RBC 5.04 Million/uL      Hemoglobin 14.9 g/dL      Hematocrit 44.9 %      MCV 89 fL      MCH 29.6 pg      MCHC 33.2 g/dL      RDW 11.9 %      MPV 9.7 fL      Platelets 253 Thousands/uL      nRBC 0 /100 WBCs      Segmented % 56 %      Immature Grans % 0 %      Lymphocytes % 32 %      Monocytes % 9 %      Eosinophils Relative 2 %      Basophils Relative 1 %      Absolute Neutrophils 3.69 Thousands/µL      Absolute Immature Grans 0.01 Thousand/uL       Absolute Lymphocytes 2.09 Thousands/µL      Absolute Monocytes 0.55 Thousand/µL      Eosinophils Absolute 0.12 Thousand/µL      Basophils Absolute 0.04 Thousands/µL             CT abdomen pelvis with contrast   Final Interpretation by Mauricio Arzate MD (02/18 0373)      1.  Distal esophagitis. Otherwise, no acute findings in the abdomen and pelvis.   2.  There is a 7 mm lingular nodule. Per Fleischner guidelines, recommend follow-up chest CT in 6 months.   3.  Please refer to pelvic ultrasound on the same date 2/18/2024, for evaluation of the endometrium and right adnexal cystic lesions.      The study was marked in EPIC for immediate notification.      Workstation performed: [id number]         US pelvis complete w transvaginal   Final Interpretation by Efe Barvo DO (02/18 9440)      1.  Endometrial echocomplex measures 5 mm in AP caliber which is minimally thickened for a postmenopausal patient (normal being less than 5 mm) with scattered cystic changes versus trace intracavitary fluid. This is similar in appearance to ultrasound    December 2023. Differential includes endometrial hyperplasia, however, neoplasm centile excluded. OB/GYN follow-up is advised. Endometrial sampling would be recommended if the patient is experiencing abnormal bleeding.   2.  Cystic structure within the right adnexa measuring 3.4 cm with thin incomplete septations, not appreciably changed in size from ultrasound of December 2023. Differential considerations include complex paraovarian cyst versus hydrosalpinx. Peritoneal    inclusion cyst is felt to be less likely there is no history of prior pelvic surgery. Follow-up pelvic ultrasound is recommended in 6-12 months to confirm ongoing stability. Loyal interval follow-up may be performed as clinically warranted.   3.  Simple appearing right ovarian cysts measuring up to 2.3 cm, also not appreciably changed from prior ultrasound.   .      The study was marked in EPIC for  immediate notification.                     Workstation performed: [id number]             Procedures    ED Medication and Procedure Management   Prior to Admission Medications   Prescriptions Last Dose Informant Patient Reported? Taking?   cycloSPORINE (RESTASIS) 0.05 % ophthalmic emulsion   Yes No      Facility-Administered Medications: None     Discharge Medication List as of 2/18/2025  2:48 PM        CONTINUE these medications which have NOT CHANGED    Details   cycloSPORINE (RESTASIS) 0.05 % ophthalmic emulsion Historical Med           No discharge procedures on file.  ED SEPSIS DOCUMENTATION   Time reflects when diagnosis was documented in both MDM as applicable and the Disposition within this note       Time User Action Codes Description Comment    2/18/2025  2:47 PM Tori Ahmadi Add [R10.9] Abdominal pain                  Tori Ahmadi MD  02/18/25 9301

## 2025-02-18 NOTE — ED NOTES
Pt seen, assessed and d/c by provider. Pt appeared to be in no acute distress upon discharge. Pt able to ambulate well without assistance upon exiting.      Nerissa Cain RN  02/18/25 9607

## 2025-02-18 NOTE — DISCHARGE INSTRUCTIONS
Follow-up with primary care and ObGyn for further care. Contact info provided below if needed.  Use over the counter medications as stated on the bottle as needed for pain control.  - Tylenol  - Ibuprofen  Return to the ED with new or worsening symptoms.

## 2025-02-18 NOTE — ED NOTES
Provider made aware that pt reports increase in pain to 6/10.     Yvrose Craft RN  02/18/25 2071

## 2025-02-19 ENCOUNTER — OFFICE VISIT (OUTPATIENT)
Dept: FAMILY MEDICINE CLINIC | Facility: CLINIC | Age: 60
End: 2025-02-19
Payer: COMMERCIAL

## 2025-02-19 VITALS
RESPIRATION RATE: 20 BRPM | BODY MASS INDEX: 26.2 KG/M2 | HEIGHT: 66 IN | HEART RATE: 96 BPM | DIASTOLIC BLOOD PRESSURE: 80 MMHG | WEIGHT: 163 LBS | SYSTOLIC BLOOD PRESSURE: 128 MMHG | TEMPERATURE: 98 F

## 2025-02-19 DIAGNOSIS — R93.89 THICKENED ENDOMETRIUM: ICD-10-CM

## 2025-02-19 DIAGNOSIS — R80.9 URINE PROTEIN INCREASED: ICD-10-CM

## 2025-02-19 DIAGNOSIS — K59.00 CONSTIPATION, UNSPECIFIED CONSTIPATION TYPE: ICD-10-CM

## 2025-02-19 DIAGNOSIS — M54.10 RADICULAR LOW BACK PAIN: ICD-10-CM

## 2025-02-19 DIAGNOSIS — N83.201 RIGHT OVARIAN CYST: Primary | ICD-10-CM

## 2025-02-19 DIAGNOSIS — R91.1 LUNG NODULE: ICD-10-CM

## 2025-02-19 DIAGNOSIS — Z12.11 SCREENING FOR COLON CANCER: ICD-10-CM

## 2025-02-19 PROCEDURE — 99214 OFFICE O/P EST MOD 30 MIN: CPT | Performed by: FAMILY MEDICINE

## 2025-02-19 RX ORDER — PREDNISONE 50 MG/1
50 TABLET ORAL DAILY
Qty: 5 TABLET | Refills: 0 | Status: SHIPPED | OUTPATIENT
Start: 2025-02-19

## 2025-02-19 RX ORDER — BACLOFEN 20 MG/1
20 TABLET ORAL 3 TIMES DAILY
Qty: 20 TABLET | Refills: 0 | Status: SHIPPED | OUTPATIENT
Start: 2025-02-19

## 2025-02-19 NOTE — PROGRESS NOTES
Name: Patricia Redmond      : 1965      MRN: [id number]  Encounter Provider: Daniella Hall DO  Encounter Date: 2025   Encounter department: [facility]  :  Assessment & Plan  Right ovarian cyst  Persistent on ultrasound   Orders:  •  Ambulatory referral to Obstetrics / Gynecology; Future    Screening for colon cancer    Orders:  •  Ambulatory referral to Gastroenterology; Future    Lung nodule  New on CT scan, 6 months follow up CT ordered  Orders:  •  CT lung nodule follow-up; Future    Thickened endometrium  New and having discomfort, but not bleeding   Orders:  •  Ambulatory referral to Obstetrics / Gynecology; Future    Urine protein increased  Will have her get a repeat first urine specimen of the day  Orders:  •  Urinalysis with microscopic; Future    Constipation, unspecified constipation type  Mild of magnesia recommended for short term use        Radicular low back pain  Suspect the pain that she is having is coming from her back.  Her abdominal exam is normal and CT scan did not explain her pain.   Orders:  •  baclofen 20 mg tablet; Take 1 tablet (20 mg total) by mouth 3 (three) times a day  •  Ambulatory referral to Physical Therapy; Future  •  predniSONE 50 mg tablet; Take 1 tablet (50 mg total) by mouth daily Take with food    Return if symptoms worsen or fail to improve.       History of Present Illness   Pain started over this past weekend.  The pain became very severe.   She had a small bowel movement yesterday, but she has not been moving her bowels     Abdominal Pain  This is a new problem. The current episode started in the past 7 days. The onset quality is sudden. The problem occurs constantly. The problem has been rapidly worsening. The pain is located in the RLQ. The pain is at a severity of 10/10. The quality of the pain is sharp. The abdominal pain radiates to the right flank. Associated symptoms include constipation. Pertinent negatives include no anorexia, arthralgias,  "belching, diarrhea, dysuria, fever, flatus, frequency, headaches, hematochezia, hematuria, melena, myalgias, nausea, vomiting or weight loss. The pain is aggravated by movement.     Review of Systems   Constitutional:  Negative for fever and weight loss.   Gastrointestinal:  Positive for abdominal pain and constipation. Negative for anorexia, diarrhea, flatus, hematochezia, melena, nausea and vomiting.   Genitourinary:  Negative for dysuria, frequency and hematuria.   Musculoskeletal:  Negative for arthralgias and myalgias.   Neurological:  Negative for headaches.       Objective   /80   Pulse 96   Temp 98 °F (36.7 °C)   Resp 20   Ht 5' 6\" (1.676 m)   Wt 73.9 kg (163 lb)   BMI 26.31 kg/m²      Physical Exam  Vitals and nursing note reviewed.   Constitutional:       General: She is not in acute distress.     Appearance: She is well-developed.   HENT:      Head: Normocephalic and atraumatic.      Right Ear: Tympanic membrane normal.      Left Ear: Tympanic membrane normal.   Cardiovascular:      Rate and Rhythm: Normal rate and regular rhythm.      Heart sounds: No murmur heard.  Pulmonary:      Effort: Pulmonary effort is normal. No respiratory distress.      Breath sounds: Normal breath sounds.   Abdominal:      General: Abdomen is flat. Bowel sounds are normal.      Palpations: Abdomen is soft.      Tenderness: There is no abdominal tenderness. There is no guarding or rebound.   Musculoskeletal:      Cervical back: Neck supple.   Neurological:      Mental Status: She is alert.     Tenderness on right flank     "

## 2025-02-21 ENCOUNTER — TELEPHONE (OUTPATIENT)
Age: 60
End: 2025-02-21

## 2025-02-21 ENCOUNTER — EVALUATION (OUTPATIENT)
Dept: PHYSICAL THERAPY | Facility: CLINIC | Age: 60
End: 2025-02-21
Payer: COMMERCIAL

## 2025-02-21 DIAGNOSIS — M54.10 RADICULAR LOW BACK PAIN: ICD-10-CM

## 2025-02-21 PROCEDURE — 97162 PT EVAL MOD COMPLEX 30 MIN: CPT

## 2025-02-21 NOTE — PROGRESS NOTES
PT Evaluation     Today's date: 2025  Patient name: Patricia Redmond  : 1965  MRN: [id number]  Referring provider: Daniella Hall DO  Dx:   Encounter Diagnosis     ICD-10-CM    1. Radicular low back pain  M54.10 Ambulatory referral to Physical Therapy            Assessment  Assessment details: Patient is a 59 y.o. Female who presents to skilled outpatient PT with referring diagnosis of lumbar radiculopathy. Patient presents with decreased ROM of lumbar spine, pain with ADLs, and antalgic gait. The aforementioned impairments have limited the patient's ability to sit, walk, sleep without pain. Positive in supine to sit and forward flexion test for anteriorly rotated left innominate which responded well to posterior thrust. Responded well to prone press ups and SIA with decreased pain. Patient education performed during today's session included: HEP as noted on flow sheet, nature of lumbar radiculopathy, and postural education. Patient verbalized understanding of POC.    Impairments: Abnormal or restricted ROM, Activity intolerance, Impaired physical strength, Lacks appropriate HEP, Poor posture, and Pain with function  Understanding of Dx/Px/POC: Good  Prognosis: Good      Plan  Patient would benefit from: Skilled PT  Planned modality interventions: Dry needling, Biofeedback, Cryotherapy, TENS, and Thermotherapy: Hydrocollator Packs  Planned therapy interventions: Abdominal trunk stabilization, Breathing training, Dry Needling, Functional ROM exercises, HEP, Joint mobilization, Manual therapy, Grayson taping, Neuromuscular re-education, Patient education, Postural training, Strengthening, Stretching, Therapeutic activities, and Therapeutic exercises  Frequency: 2x/wk  Duration in weeks: 10  Plan of Care beginning date: 25  Plan of Care expiration date: 10 weeks - 2025  Treatment plan discussed with: Patient       Goals  Short Term Goals (4 weeks):    - Patient will be independent in basic  HEP 2-3 weeks  - Patient will have 0/10 pain at rest  - Patient will demonstrate >1/3 improvement in MMT grade as applicable  - Patient will be able to sleep through the night without pain    Long Term Goals (8 weeks):  - Patient will be independent in a comprehensive home exercise program  - Patient FOTO score will improve by 10 points.   - Patient will self-report >75% improvement in function  - Patient will be able to ambulate a mile without pain      Subjective    History of Present Illness  - Mechanism of injury: Sun and Monday low ache in right, lateral side. Got worse Mon night, bent over on Tu to get something and excruciating pain sent her to hospital. Did liver checks and she was fine. Discharged from hospital with pain pills.  Has not had bowel movements regularly this week which is very odd for her.     - Functional limitations: bending over, sitting without pain, sleep in bed    - Patient goals: to be able to do ADLs without pain    Red Flag Screening    Positive for: age >50 years old   Negative for: history of cancer, fever, chills, night sweats, weight loss, recent infection, immunosuppression, rest/night pain, saddle anesthesia, bladder dysfunction, and LE neurological deficits  Pain  - Current pain ratin-5/10  - At best pain ratin/10  - At worst pain rating: 10/10  - Location: right side  - Alleviating factors: nothing    Social Support  - Steps to enter house: 2  - Stairs in house: 15   - Bedroom/bathroom set up: 2nd floor  - Lives in: house  - Lives with:       Objective   LE MMT    L Hip Flexion: 5/5  R Hip Flexion: 5/5   L Hip Extension: 5/5 R Hip Extension: 5/5   L Hip Abduction: 5/5 R Hip Abduction: 5/5   L Hip Adduction: 5/5 R Hip Adduction: 5/5   L Knee Extension: 5/5 R Knee Extension: 5/5   L Knee Flexion: 5/5 R Knee Flexion: 5/5   L Ankle DF: 5/5 R Ankle DF: 5/5   L Ankle PF: 5/5  R Ankle PF: 5/5         Movement Loss % Symptoms   Flexion 75 + pain   Extension 25 + pain    Side bending R 50 + pain   Side bending L 75 + pain   R rotation 100 no pain    L rotation 100 no pain       Symptom response Mechanical Response    During testing After testing Effect (?? ROM or key functional test) No effect   Pretest symptoms in standing       FIS       RFIS       EIS       SIA 2*10- better             Pretest symptoms lying       ESTELA 10x- better      RFIL       EIL       REIL              Pretest symptoms       R SGIS       R RSGIS       L SGIS       L RSGIS              Other movements             Sensation  - Light touch: intact    Palpation   -WNL      Special Testing L R   FABIR     FADIR     Hip Scour      Thigh thrust     Flick test      Standing flexion test      Prone knee flexion test     Supine to long sit test     Neurodynamic assessment            Precautions: standard  History reviewed. No pertinent past medical history.      Insurance Eval/ Re-eval POC expires Auth Status Total visits  Start date  Expiration date Misc   CMS 2/21/25 5/2/25                    Date 2/21/2025        Visit Number IE    FOTO    Auth                  Manual         P-A mobs Left posterior innominate thrust Gr V                                   Neuro Re-ed         TrA progression         Hip add squeeze         Clamshells          Bridge          Sciatic nerve sliders                  Thera Ex         SIA/REIL HEP        TB shoulder ext         TB shoulder row         Paloff press          Anti-rotation press                                    Thera Activity         Patient education         Lifting mechanics         Posture education                                                      Modalities

## 2025-02-21 NOTE — TELEPHONE ENCOUNTER
Patient was in ED Tuesday.  Has right ovarian pain with shooting pain on right side, certain positions are very uncomfortable. No vaginal bleeding.  Post menopausal at age 40 years.  Patient has appointment at [facility] 3/12/25,   Patient wants to be scheduled at [facility]. asked if sooner scheduled could be made with [facility] if possible -  would want to f/u with any physician provider, @ [place], [place] or [facility].   No appts currently available to scheduled for patient before the 3/12/25 appointment she has. Offered to sent to clerical team if sooner scheduling could be offered.   HP sent to [facility] clerical if sooner appointment could be offered at [facility].

## 2025-02-23 ENCOUNTER — HOSPITAL ENCOUNTER (EMERGENCY)
Facility: HOSPITAL | Age: 60
Discharge: HOME | End: 2025-02-23
Attending: EMERGENCY MEDICINE
Payer: COMMERCIAL

## 2025-02-23 VITALS
RESPIRATION RATE: 18 BRPM | DIASTOLIC BLOOD PRESSURE: 96 MMHG | HEART RATE: 100 BPM | TEMPERATURE: 98.2 F | OXYGEN SATURATION: 96 % | SYSTOLIC BLOOD PRESSURE: 176 MMHG

## 2025-02-23 DIAGNOSIS — I10 HYPERTENSION, UNSPECIFIED TYPE: ICD-10-CM

## 2025-02-23 DIAGNOSIS — M54.50 ACUTE RIGHT-SIDED LOW BACK PAIN WITHOUT SCIATICA: Primary | ICD-10-CM

## 2025-02-23 DIAGNOSIS — R10.9 RIGHT FLANK PAIN: ICD-10-CM

## 2025-02-23 PROCEDURE — 99284 EMERGENCY DEPT VISIT MOD MDM: CPT | Performed by: EMERGENCY MEDICINE

## 2025-02-23 PROCEDURE — 99283 EMERGENCY DEPT VISIT LOW MDM: CPT

## 2025-02-23 RX ORDER — OXYCODONE HYDROCHLORIDE 5 MG/1
5 TABLET ORAL EVERY 6 HOURS PRN
Qty: 8 TABLET | Refills: 0 | Status: SHIPPED | OUTPATIENT
Start: 2025-02-23

## 2025-02-23 NOTE — DISCHARGE INSTRUCTIONS
Diagnosis right flank/ r low back pain / elevated blood  pressure in the er 170/90    - activity as tolerated -- the majority of muscle non herniated disc lower back pain will resolve on its own over the next 2- 3 weeks    - for pain- can try both over the counter generic acetaminophen 500 mg- together with over the counter generic  ibuprofen 400 mg 4 times a day with meals/ liquids-     - can use over the counter lidocaine patches to area as needed -- can use up to 3 patches on body at one time -can not get warm or wet-  can leave on for up to 12 hrs at a time    - only as needed for severe pain- oxycodone take one tablet on an as needed basis    -  please stop the prednisone and baclofen     - can contact the spine and pain center to schedule an appointment -- I placed a referral in the computer for you      Your blood pressure was elevated in the er - 170/90- the er is not the best place to check your blood pressure especially if you are in pain -- you will to have your blood pressure repeated over several different times and places when you are pain free calm and relaxed - it should be persistently under 140/90- if it is persistently greater than 140/90 and you have no acute symptoms- you do not need to return to  the er- you just need to followup with your primary doctor     - please return to  the er for any worsening/ intractable pain with theainability to function do to the pain - any fevers- temp > 100.4- any persistent vomiting- any bloody /stools or black tarry stools- any new weakness in foot/leg- any inability to move or control your ruine/stools- any numbness in your saddle area

## 2025-02-23 NOTE — ED PROVIDER NOTES
Time reflects when diagnosis was documented in both MDM as applicable and the Disposition within this note       Time User Action Codes Description Comment    2/23/2025 12:31 PM Hector Romo Add [M54.50] Acute right-sided low back pain without sciatica     2/23/2025 12:32 PM Hector Romo Add [I10] Hypertension, unspecified type     2/23/2025 12:33 PM Hector Romo Add [R10.9] Right flank pain           ED Disposition       ED Disposition   Discharge    Condition   Stable    Date/Time   Sun Feb 23, 2025 12:31 PM    Comment   Patricia Redmond discharge to home/self care.                   Assessment & Plan       Medical Decision Making   After h and p- pt with ms r flank/iliac crest pain upon movement only - n o red flags upon h and p- with neg recent er workup fro any intra-abd cause-- er md doubt any chest referred pain - any current intra-abd/ retroperitoneal - cns/sc  cause--     Problems Addressed:  Acute right-sided low back pain without sciatica: acute illness or injury     Details: See chart and above   Hypertension, unspecified type: acute illness or injury     Details: D/w pt and husbadn about need to recheck bp after back pain has resolved   Right flank pain: acute illness or injury     Details: See above     Amount and/or Complexity of Data Reviewed  Independent Historian: spouse  External Data Reviewed: labs, radiology and notes.     Details: All reviewed   Discussion of management or test interpretation with external provider(s): Moderate amount of er md thought complexity     Risk  Prescription drug management.  Decision regarding hospitalization.        ED Course as of 02/23/25 1305   Sun Feb 23, 2025   1230 Er md note- recent labs/ testing all reviewed by er md        Medications - No data to display    ED Risk Strat Scores                                                History of Present Illness       Chief Complaint   Patient presents with    Abdominal Pain     RLQ abdominal pain. Was  seen at [facility] on Tuesday and everything was negative. Also went to PCP and prescribed muscles relaxers and steroids with no relief. Pt reports no relief.        History reviewed. No pertinent past medical history.   Past Surgical History:   Procedure Laterality Date    ELBOW FRACTURE REPAIR Left 8/13/2016    Procedure: OPEN REDUCTION W/ INTERNAL FIXATION (ORIF) ELBOW;  Surgeon: Venecia Cabezas MD;  Location: [facility];  Service:     FINGER DEBRIDEMENT      HAND DEBRIDEMENT Left 2/26/2017    Procedure: Arthrotomy, I/D left elblow;  Surgeon: Richar Arroyo MD;  Location: [facility];  Service:     ORIF HUMERUS FRACTURE Left 8/13/2016    Procedure: OPEN REDUCTION W/ INTERNAL FIXATION (ORIF) HUMERUS MIDSHAFT / DISTAL;  Surgeon: Venecia Cabezas MD;  Location: [facility];  Service:     ORIF WRIST FRACTURE Left 8/13/2016    Procedure: OPEN REDUCTION W/ INTERNAL FIXATION (ORIF) RADIUS / ULNA (WRIST);  Surgeon: Venecia Cabezas MD;  Location: [facility];  Service:     TONSILECTOMY AND ADNOIDECTOMY      WOUND DEBRIDEMENT Left 8/17/2016    Procedure: DEBRIDEMENT UPPER EXTREMITY (WASH OUT) AND WOUND CLOSURE;  Surgeon: Laron Quispe MD;  [facility];  Service:     WOUND DEBRIDEMENT Left 8/15/2016    Procedure: DEBRIDEMENT WOUND AND DRESSING CHANGE (WASH OUT). Left forearm;  Surgeon: Venecia Cabezas MD;  [facility];  Service:     WOUND DEBRIDEMENT Left 8/13/2016    Procedure: DEBRIDEMENT WOUND (WASH OUT);  Surgeon: Venecia Cabezas MD;  [facility]: [facility];  Service:       Family History   Problem Relation Age of Onset    Autoimmune disease Mother     Stomach cancer Father     Liver disease Father     Diabetes Father     Lupus Sister     Diabetes Brother     Rheum arthritis Sister     Bipolar disorder Sister     Breast cancer Paternal Grandmother 80      Social History     Tobacco Use    Smoking status: Former     Current packs/day: 0.00     Average packs/day: 1 pack/day for 22.0 years (22.0  ttl pk-yrs)     Types: Cigarettes     Start date:      Quit date:      Years since quittin.1    Smokeless tobacco: Never   Vaping Use    Vaping status: Never Used   Substance Use Topics    Alcohol use: Yes     Alcohol/week: 6.0 standard drinks of alcohol     Types: 6 Cans of beer per week     Comment: social    Drug use: Never      E-Cigarette/Vaping    E-Cigarette Use Never User       E-Cigarette/Vaping Substances    Nicotine No     THC No     CBD No     Flavoring No     Other No     Unknown No       I have reviewed and agree with the history as documented.     59 yr  female with approx 7 weeks of initially mild r flank/r low back pain-  worse with movements--   states earlier this week- bent over with mild back flexion  and felt something pop in same area- since pain has been worse upon any movement- seen at [facility] on - with extensive  workup- pt has unchanged r ovarian cysts- endometrial hyperplasia- referral placed by pmd for gyn -- pt with no gu/gyn comps-   subsequently seen by  pmd placed on pred/baclofen- felt more  ms back pain - pt presents to er with pain in same r lower flank/ lateral low back area worse with movements only when remaining still has no pain -- no fevers/ uri/cough /cp/sob- no v/d- no rash in r flank- no ble weakness/ change in sensation - b/b incont- no bm this week -- no saddle anesthesia- no rle lumbar radiculopathy type symptoms      History provided by:  Patient and spouse  Abdominal Pain  Pain quality: dull and sharp    Pain radiation: front to back.  Pain severity:  Moderate  Onset quality:  Gradual  Duration:  7 days  Timing:  Intermittent  Progression:  Unchanged  Chronicity:  New  Relieved by:  Movement  Worsened by:  Nothing  Associated symptoms: no chest pain, no constipation, no diarrhea, no nausea and no vomiting        Review of Systems   Constitutional: Negative.    HENT: Negative.     Eyes: Negative.    Respiratory: Negative.     Cardiovascular:  Negative.  Negative for chest pain, palpitations and leg swelling.   Gastrointestinal:  Negative for abdominal distention, abdominal pain, anal bleeding, blood in stool, constipation, diarrhea, nausea, rectal pain and vomiting.   Endocrine: Negative.    Genitourinary: Negative.    Musculoskeletal:  Positive for back pain. Negative for arthralgias, gait problem, joint swelling, myalgias, neck pain and neck stiffness.   Skin: Negative.    Allergic/Immunologic: Negative.    Neurological: Negative.    Hematological: Negative.    Psychiatric/Behavioral: Negative.             Objective       ED Triage Vitals [02/23/25 1120]   Temperature Pulse Blood Pressure Respirations SpO2 Patient Position - Orthostatic VS   98.2 °F (36.8 °C) 100 (!) 176/96 18 96 % Sitting      Temp Source Heart Rate Source BP Location FiO2 (%) Pain Score    Oral Monitor Right arm -- --      Vitals      Date and Time Temp Pulse SpO2 Resp BP Pain Score FACES Pain Rating User   02/23/25 1120 98.2 °F (36.8 °C) 100 96 % 18 176/96 -- -- AW            Physical Exam  Vitals and nursing note reviewed.   Constitutional:       General: She is not in acute distress.     Appearance: She is well-developed. She is not ill-appearing, toxic-appearing or diaphoretic.      Comments: Avss- htnsive- in nad- pulse ox  96 % on ra- interpretation is  normal- no intervention    HENT:      Head: Normocephalic and atraumatic.   Eyes:      General: No scleral icterus.     Extraocular Movements: Extraocular movements intact.      Pupils: Pupils are equal, round, and reactive to light.      Comments: Mm pink    Cardiovascular:      Rate and Rhythm: Normal rate and regular rhythm.      Heart sounds: Normal heart sounds. No murmur heard.     No friction rub. No gallop.      Comments: Equal bilateral radial/dp pulses- no ble edema/calf tenderness/asym/erythema  Pulmonary:      Effort: Pulmonary effort is normal. No respiratory distress.      Breath sounds: Normal breath sounds. No  stridor. No wheezing, rhonchi or rales.   Chest:      Chest wall: No tenderness.   Abdominal:      General: Abdomen is flat. Bowel sounds are normal. There is no distension or abdominal bruit. There are no signs of injury.      Palpations: Abdomen is soft. There is no shifting dullness, fluid wave, hepatomegaly, splenomegaly, mass or pulsatile mass.      Tenderness: There is no abdominal tenderness. There is no right CVA tenderness, left CVA tenderness, guarding or rebound. Negative signs include Gonzales's sign, Rovsing's sign, McBurney's sign, psoas sign and obturator sign.      Hernia: No hernia is present. There is no hernia in the umbilical area, ventral area, left inguinal area, right femoral area, left femoral area or right inguinal area.      Comments: Soft nt/nd- no hsm - n o cva tenderness- no ascites- no peritoneal signs- no pulsatile abd mass/bruit- no bilateral inguinal/ femoral hernia's-    Skin:     General: Skin is warm.      Capillary Refill: Capillary refill takes less than 2 seconds.      Coloration: Skin is not cyanotic, jaundiced, mottled or pale.      Findings: No erythema or rash.   Neurological:      General: No focal deficit present.      Mental Status: She is alert and oriented to person, place, and time.      Cranial Nerves: No cranial nerve deficit.      Motor: No weakness.      Comments: Normal non focal neuro exam- normal cn exam- normal bue/ble strength /sensation - normal gait in er  -- pt has r lateral iliac crest  a area pain upon active rom only -- neg r slrt and xed slrt - no pain upon passive / external rotation /flex/ext- abd/adduction of r hip--    Psychiatric:         Mood and Affect: Mood normal. Mood is not anxious or depressed.         Behavior: Behavior normal.         Results Reviewed       None            No orders to display       Procedures    ED Medication and Procedure Management   Prior to Admission Medications   Prescriptions Last Dose Informant Patient Reported?  Taking?   baclofen 20 mg tablet   No No   Sig: Take 1 tablet (20 mg total) by mouth 3 (three) times a day   cycloSPORINE (RESTASIS) 0.05 % ophthalmic emulsion   Yes No   Patient taking differently: Administer 1 drop to the right eye every 12 (twelve) hours   predniSONE 50 mg tablet   No No   Sig: Take 1 tablet (50 mg total) by mouth daily Take with food      Facility-Administered Medications: None     Discharge Medication List as of 2/23/2025 12:44 PM        START taking these medications    Details   oxyCODONE (Roxicodone) 5 immediate release tablet Take 1 tablet (5 mg total) by mouth every 6 (six) hours as needed for moderate pain for up to 8 doses Max Daily Amount: 20 mg, Starting Sun 2/23/2025, Normal           CONTINUE these medications which have NOT CHANGED    Details   baclofen 20 mg tablet Take 1 tablet (20 mg total) by mouth 3 (three) times a day, Starting Wed 2/19/2025, Normal      cycloSPORINE (RESTASIS) 0.05 % ophthalmic emulsion Historical Med      predniSONE 50 mg tablet Take 1 tablet (50 mg total) by mouth daily Take with food, Starting Wed 2/19/2025, Normal             ED SEPSIS DOCUMENTATION   Time reflects when diagnosis was documented in both MDM as applicable and the Disposition within this note       Time User Action Codes Description Comment    2/23/2025 12:31 PM Hector Romo [M54.50] Acute right-sided low back pain without sciatica     2/23/2025 12:32 PM Hector Romo [I10] Hypertension, unspecified type     2/23/2025 12:33 PM Hector Romo [R10.9] Right flank pain                  Hector Romo MD  02/23/25 6915

## 2025-02-23 NOTE — Clinical Note
Patricia Redmond was seen and treated in our emergency department on 2/23/2025.                Diagnosis:     Patricia  may return to work on return date.    She may return on this date: 03/03/2025    Patient will be unable to drive for next week      If you have any questions or concerns, please don't hesitate to call.      Hector Romo MD    ______________________________           _______________          _______________  Hospital Representative                              Date                                Time

## 2025-02-24 ENCOUNTER — APPOINTMENT (OUTPATIENT)
Dept: RADIOLOGY | Facility: CLINIC | Age: 60
End: 2025-02-24
Payer: COMMERCIAL

## 2025-02-24 ENCOUNTER — CONSULT (OUTPATIENT)
Dept: PAIN MEDICINE | Facility: CLINIC | Age: 60
End: 2025-02-24
Payer: COMMERCIAL

## 2025-02-24 VITALS — HEIGHT: 66 IN | BODY MASS INDEX: 25.71 KG/M2 | WEIGHT: 160 LBS

## 2025-02-24 DIAGNOSIS — M54.50 ACUTE RIGHT-SIDED LOW BACK PAIN WITHOUT SCIATICA: Primary | ICD-10-CM

## 2025-02-24 DIAGNOSIS — M54.50 ACUTE RIGHT-SIDED LOW BACK PAIN WITHOUT SCIATICA: ICD-10-CM

## 2025-02-24 DIAGNOSIS — M79.18 MYOFASCIAL PAIN SYNDROME: ICD-10-CM

## 2025-02-24 PROCEDURE — 72100 X-RAY EXAM L-S SPINE 2/3 VWS: CPT

## 2025-02-24 PROCEDURE — 99204 OFFICE O/P NEW MOD 45 MIN: CPT | Performed by: STUDENT IN AN ORGANIZED HEALTH CARE EDUCATION/TRAINING PROGRAM

## 2025-02-24 RX ORDER — MELOXICAM 7.5 MG/1
7.5 TABLET ORAL DAILY
Qty: 14 TABLET | Refills: 0 | Status: SHIPPED | OUTPATIENT
Start: 2025-02-24 | End: 2025-03-10

## 2025-02-24 NOTE — PROGRESS NOTES
Assessment:  1. Acute right-sided low back pain without sciatica    2. Myofascial pain syndrome    Patient is a pleasant 59-year-old woman who presents as a new patient visit after being referred from the ED for acute right sided Low back pain which radiates into her right hip.  Over the past month the intensity pains been moderate to severe and she rates her pain currently as a 4 out of 10 on numeric rating scale.  States it was a 9 yesterday.  The pain occurs nearly constantly and symptoms are worse in the morning and afternoon.  She describes as a catching pain with some associated weakness in upper extremities.  She does not use any assistive devices.  While in the ED patient received a CT scan of her abdomen and pelvis which showed some distal esophagitis.  Patient with x-ray of the lumbar spine does show some facet arthropathy.  Patient was given a prescription for oxycodone 5 mg as needed for pain.  Patient reports moderate temporary relief with physical therapy and moderate relief with heat and ice.  Prior medication clued oxycodone current medications include Advil along with Tylenol.  Patient was trialed on baclofen but states it was not helpful.  She also received steroid tapers which also was not helpful. X-ray independently reviewed and discussed with patient.     On further discussion with patient she states her acute flare of symptoms are improving.  On examination patient with some tenderness to palpation along the right lateral thigh and right lower quadrant.  Maneuvers targeting the sacroiliac joint and the right hip negative.  Negative straight leg raise on exam.  Patient symptoms likely myofascial in nature and patient counseled to continue with physical therapy.  In 6 weeks can plan to follow-up for MRI of the lumbar spine if symptoms persist.  For symptomatic relief we will start meloxicam 7.5 mg daily as needed.  Patient counseled to continue with use of heat and ice and use of a TENS  unit.    Plan:  Continue Physical therapy   Start Mobic 7.5 mg daily prn   TENS Unit  Follow up in six weeks if symptoms persist, can consider MRI of lumbar spine at that time.   Orders Placed This Encounter   Procedures   • XR spine lumbar 2 or 3 views injury     Standing Status:   Future     Number of Occurrences:   1     Expected Date:   2/24/2025     Expiration Date:   2/24/2029     Scheduling Instructions:      Bring along any outside films relating to this procedure.           Is the patient pregnant?:   No       New Medications Ordered This Visit   Medications   • meloxicam (Mobic) 7.5 mg tablet     Sig: Take 1 tablet (7.5 mg total) by mouth daily for 14 days     Dispense:  14 tablet     Refill:  0       My impressions and treatment recommendations were discussed in detail with the patient, who verbalized understanding and had no further questions.      Follow-up is planned in six weeks time or sooner as warranted.  Discharge instructions were provided. I personally saw and examined the patient and I agree with the above discussed plan of care.    History of Present Illness:    Patricia Redmond is a 59 y.o. female who presents to [facility] for initial evaluation of the above stated pain complaints. The patient has a past medical and chronic pain history as outlined in the assessment section. She was referred by Hector Romo MD  [facility] [place] [address].    Patient is a pleasant 59-year-old woman who presents as a new patient visit after being referred from the ED for acute right sided Low back pain which radiates into her right hip.  Over the past month the intensity pains been moderate to severe and she rates her pain currently as a 4 out of 10 on numeric rating scale.  States it was a 9 yesterday.  The pain occurs nearly constantly and symptoms are worse in the morning and afternoon.  She describes as a catching pain with some associated weakness in upper  extremities.  She does not use any assistive devices.  While in the ED patient received a CT scan of her abdomen and pelvis which showed some distal esophagitis.  Patient with x-ray of the lumbar spine does show some facet arthropathy.  Patient was given a prescription for oxycodone 5 mg as needed for pain.  Patient reports moderate temporary relief with physical therapy and moderate relief with heat and ice.  Prior medication clued oxycodone current medications include Advil along with Tylenol.  Patient was trialed on baclofen but states it was not helpful.  She also received steroid tapers which also was not helpful.    Review of Systems:    Review of Systems   Constitutional:  Negative for unexpected weight change.   HENT:  Negative for ear pain.    Eyes:  Negative for visual disturbance.   Respiratory:  Negative for shortness of breath and wheezing.    Gastrointestinal:  Negative for abdominal pain.   Musculoskeletal:  Positive for back pain and gait problem.        Abd / lower back pain   Neurological:  Negative for weakness and numbness.   Psychiatric/Behavioral:  Positive for sleep disturbance. Negative for decreased concentration.            History reviewed. No pertinent past medical history.    Past Surgical History:   Procedure Laterality Date   • ELBOW FRACTURE REPAIR Left 8/13/2016    Procedure: OPEN REDUCTION W/ INTERNAL FIXATION (ORIF) ELBOW;  Surgeon: Venecia Cabezas MD;  [facility];  Service:    • FINGER DEBRIDEMENT     • HAND DEBRIDEMENT Left 2/26/2017    Procedure: Arthrotomy, I/D left [facility];  Surgeon: Richar Arroyo MD;  [facility];  Service:    • ORIF HUMERUS FRACTURE Left 8/13/2016    Procedure: OPEN REDUCTION W/ INTERNAL FIXATION (ORIF) HUMERUS MIDSHAFT / DISTAL;  Surgeon: Venecia Cabezas MD;  [facility];  Service:    • ORIF WRIST FRACTURE Left 8/13/2016    Procedure: OPEN REDUCTION W/ INTERNAL FIXATION (ORIF) RADIUS / ULNA (WRIST);  Surgeon: Venecia  MD Jocelynn;  [facility];  Service:    • TONSILECTOMY AND ADNOIDECTOMY     • WOUND DEBRIDEMENT Left 2016    Procedure: DEBRIDEMENT UPPER EXTREMITY (WASH OUT) AND WOUND CLOSURE;  Surgeon: Laron Quispe MD;  [facility];  Service:    • WOUND DEBRIDEMENT Left 8/15/2016    Procedure: DEBRIDEMENT WOUND AND DRESSING CHANGE (WASH OUT). Left forearm;  Surgeon: Venecia Cabezas MD;  [facility];  Service:    • WOUND DEBRIDEMENT Left 2016    Procedure: DEBRIDEMENT WOUND (WASH OUT);  Surgeon: Venecia Cabezas MD;  [facility];  Service:        Family History   Problem Relation Age of Onset   • Autoimmune disease Mother    • Stomach cancer Father    • Liver disease Father    • Diabetes Father    • Lupus Sister    • Diabetes Brother    • Rheum arthritis Sister    • Bipolar disorder Sister    • Breast cancer Paternal Grandmother 80       Social History     Occupational History   • Not on file   Tobacco Use   • Smoking status: Former     Current packs/day: 0.00     Average packs/day: 1 pack/day for 22.0 years (22.0 ttl pk-yrs)     Types: Cigarettes     Start date:      Quit date:      Years since quittin.1   • Smokeless tobacco: Never   Vaping Use   • Vaping status: Never Used   Substance and Sexual Activity   • Alcohol use: Yes     Alcohol/week: 6.0 standard drinks of alcohol     Types: 6 Cans of beer per week     Comment: social   • Drug use: Never   • Sexual activity: Yes         Current Outpatient Medications:   •  meloxicam (Mobic) 7.5 mg tablet, Take 1 tablet (7.5 mg total) by mouth daily for 14 days, Disp: 14 tablet, Rfl: 0  •  oxyCODONE (Roxicodone) 5 immediate release tablet, Take 1 tablet (5 mg total) by mouth every 6 (six) hours as needed for moderate pain for up to 8 doses Max Daily Amount: 20 mg, Disp: 8 tablet, Rfl: 0  •  baclofen 20 mg tablet, Take 1 tablet (20 mg total) by mouth 3 (three) times a day (Patient not taking: Reported on 2025),  "Disp: 20 tablet, Rfl: 0  •  cycloSPORINE (RESTASIS) 0.05 % ophthalmic emulsion, , Disp: , Rfl:   •  predniSONE 50 mg tablet, Take 1 tablet (50 mg total) by mouth daily Take with food (Patient not taking: Reported on 2/24/2025), Disp: 5 tablet, Rfl: 0    No Known Allergies    Physical Exam:    Ht 5' 6\" (1.676 m)   Wt 72.6 kg (160 lb)   BMI 25.82 kg/m²     Constitutional: normal, well developed, well nourished, alert, in no distress and non-toxic and no overt pain behavior.  Eyes: anicteric  HEENT: grossly intact  Neck: supple, symmetric, trachea midline and no masses   Pulmonary:even and unlabored  Cardiovascular:No edema or pitting edema present  Skin:Normal without rashes or lesions and well hydrated  Psychiatric:Mood and affect appropriate  Neurologic:Cranial Nerves II-XII grossly intact  Musculoskeletal:normal gait. TTP in right lateral thigh and RLQ of abdomen/groin. Negative FADIR, negative DIANN, negative SLR.     Imaging  No orders to display       Orders Placed This Encounter   Procedures   • XR spine lumbar 2 or 3 views injury     "

## 2025-02-26 ENCOUNTER — RESULTS FOLLOW-UP (OUTPATIENT)
Dept: PAIN MEDICINE | Facility: CLINIC | Age: 60
End: 2025-02-26

## 2025-02-26 ENCOUNTER — OFFICE VISIT (OUTPATIENT)
Dept: PHYSICAL THERAPY | Facility: CLINIC | Age: 60
End: 2025-02-26
Payer: COMMERCIAL

## 2025-02-26 DIAGNOSIS — M54.10 RADICULAR LOW BACK PAIN: Primary | ICD-10-CM

## 2025-02-26 PROCEDURE — 97112 NEUROMUSCULAR REEDUCATION: CPT

## 2025-02-26 PROCEDURE — 97140 MANUAL THERAPY 1/> REGIONS: CPT

## 2025-02-26 NOTE — PROGRESS NOTES
Daily Note     Today's date: 2025  Patient name: Patricia Redmond  : 1965  MRN: [id number]  Referring provider: Daniella Hall DO  Dx:   Encounter Diagnosis     ICD-10-CM    1. Radicular low back pain  M54.10                      Subjective: Reports that she was in the ER over the weekend due to pain in RLQ of abdomen.       Objective: See treatment diary below      Assessment: Tolerated treatment well. Patient would benefit from continued PT in order to determine if there is a directional preference. Reports no change after steroid pack and muscle relaxants. Was constipated and has returned to normal. Is seeing OBGYN on Friday. Repeated motions and stretching had no change in sxs. Some of the stretching make pain slightly more sharp. Trialed PEMF to see if inflammation reduction changed sxs. Patient wants to hold future appointments until she sees OBGYN.       Plan: Continue per plan of care.      Precautions: standard  History reviewed. No pertinent past medical history.      Insurance Eval/ Re-eval POC expires Auth Status Total visits  Start date  Expiration date Misc   CMS 25                    Date 2025       Visit Number IE 2   FOTO    Auth                  Manual         P-A mobs Left posterior innominate thrust Gr V Abdominal stacking b/l                                  Neuro Re-ed         TrA progression  Cat- cow 10x       Hip add squeeze  [place] pose with left walk out 3* 30 sec       Clamshells          Bridge   Right side glide 10x + pain       Sciatic nerve sliders                  Thera Ex         SIA/REIL HEP Prone HOC left pushup 2*10       TB shoulder ext         TB shoulder row         Paloff press          Anti-rotation press                                    Thera Activity         Patient education         Lifting mechanics         Posture education                                                      Modalities

## 2025-02-26 NOTE — PROGRESS NOTES
Name: Patricia Redmond      : 1965      MRN: [id number]  Encounter Provider: Elba Paul PA-C  Encounter Date: 3/3/2025   Encounter department: [facility]  :  Assessment & Plan  Adnexal cyst    Orders:  •  US pelvis complete w transvaginal; Future    Disorder of endometrium    Orders:  •  US pelvis complete w transvaginal; Future  We did review findings on US. Given that she is not bleeding, and denies any h/o  bleeding, will plan to continue to monitor for now. No changes in EL since last US done . Aware if any changes on EL on US or any  bleeding that I would advise endometrial biopsy. Pt agreeable.   Abdominal pain, unspecified abdominal location       We reviewed all possible etiologies for abdominal pain including but not limited to ovarian/adnexal cysts, appendicitis, nephrolithiasis, adhesive disease, PID, UTI, colitis or GI origin. None of the above diagnosis fit her story and imaging.   At this point given that sx have improved will plan to watch for now. Pt has colonoscopy scheduled in April to evaluate for GI causes.   I did review recommendation to plan f/u US in 1 year to ensure continued stability of ovarian cyst.       History of Present Illness   HPI  Patricia Redmond is a 59 y.o. female who presents for discussion of abdominal pain.     Recent US showed:     FINDINGS:     UTERUS:  The uterus is anteverted in position, measuring 5.2 x 2.6 x 3.4 cm.  The uterus has a normal contour and echotexture.  The cervix appears within normal limits.     ENDOMETRIUM:  The endometrial echo complex demonstrates an AP caliber of 5 mm which is minimally thickened for a postmenopausal patient (normal being less than 5 mm). Scattered cystic changes versus trace intracavitary fluid is noted. This is overall similar in   appearance to prior ultrasound of 2023.     OVARIES/ADNEXA:  Right ovary: 3.5 x 2.2 x 2.2 cm. 9.2 mL.  Ovarian Doppler flow is within normal  limits.  Simple appearing cyst within the right ovary measures 2.0 x 1.9 x 2.3 cm, not appreciably changed in size from ultrasound of December 2023. There is a smaller simple appearing right ovarian cyst which measures 1.7 x 1.1 x 1.7 cm, also not appreciably   changed in size from prior ultrasound. There is a cystic structure within the right adnexa which is separate from the right ovary measuring 2.5 x 1.9 x 3.4 cm. This structure not significantly changed in size from prior ultrasound of December 2023 when   accounting for slight variations in measuring technique. This structure demonstrates thin incomplete septations without evidence of intrinsic vascularity.     Left ovary: 2.3 x 1.5 x 1.5 cm. 2.6 mL.  Ovarian Doppler flow is within normal limits.  No suspicious ovarian or adnexal abnormality.     OTHER:  No free fluid or loculated fluid collections.     IMPRESSION:     1.  Endometrial echocomplex measures 5 mm in AP caliber which is minimally thickened for a postmenopausal patient (normal being less than 5 mm) with scattered cystic changes versus trace intracavitary fluid. This is similar in appearance to ultrasound   December 2023. Differential includes endometrial hyperplasia, however, neoplasm centile excluded. OB/GYN follow-up is advised. Endometrial sampling would be recommended if the patient is experiencing abnormal bleeding.  2.  Cystic structure within the right adnexa measuring 3.4 cm with thin incomplete septations, not appreciably changed in size from ultrasound of December 2023. Differential considerations include complex paraovarian cyst versus hydrosalpinx. Peritoneal   inclusion cyst is felt to be less likely there is no history of prior pelvic surgery. Follow-up pelvic ultrasound is recommended in 6-12 months to confirm ongoing stability. Waldron interval follow-up may be performed as clinically warranted.  3.  Simple appearing right ovarian cysts measuring up to 2.3 cm, also not appreciably  changed from prior ultrasound.  .    Pt notes that she has been having pain in her RLQ that was stable and annoying but not significant.  This was present a few days prior to more significant sx onset.   She notes that she initially had this pain >1 year ago. She presented to the ED and found to have ovarian and adnexal cyst.   Pain at that point resolved, but not as significant as  she has had recently.  She notes that last week she had an episode of significant discomfort in her RLQ. She presented to the ED a that time.Pain was 10/10. Pain now back to dull aching, low lying.   Pt notes that she was constipated at time of discomfort but does not feel that her sx improved w BMs.  She denies noting any worsening or alleviating tx for her pain. It seemed to be worse in the am but better in the afternoon.   She notes that she was taking NASIDs and Tylenol with improvement in sx.  She notes she has seen back doc and PT without relief   Pt is  and monogamous without complaints w IC. She has not been active during this episode.   She has her paps done w her PCP and is UTD.   She denies h/o any pelvic/abdominal surgeries and denies h/o PID.       History obtained from: patient    Review of Systems   Constitutional: Negative.    Gastrointestinal:  Positive for abdominal pain and constipation. Negative for diarrhea, nausea and vomiting.   Musculoskeletal: Negative.         She denies back pain   Psychiatric/Behavioral: Negative.       Medical History Reviewed by provider this encounter:     .  Past Medical History   History reviewed. No pertinent past medical history.  Past Surgical History:   Procedure Laterality Date   • ELBOW FRACTURE REPAIR Left 8/13/2016    Procedure: OPEN REDUCTION W/ INTERNAL FIXATION (ORIF) ELBOW;  Surgeon: Venecia Cabezas MD;  [facility];  Service:    • FINGER DEBRIDEMENT     • HAND DEBRIDEMENT Left 2/26/2017    Procedure: Arthrotomy, I/D left elblow;  Surgeon: Richar Arroyo  MD;  [facility];  Service:    • ORIF HUMERUS FRACTURE Left 8/13/2016    Procedure: OPEN REDUCTION W/ INTERNAL FIXATION (ORIF) HUMERUS MIDSHAFT / DISTAL;  Surgeon: Venecia Cabezas MD;  [facility];  Service:    • ORIF WRIST FRACTURE Left 8/13/2016    Procedure: OPEN REDUCTION W/ INTERNAL FIXATION (ORIF) RADIUS / ULNA (WRIST);  Surgeon: Venecia Cabezas MD;  [facility];  Service:    • TONSILECTOMY AND ADNOIDECTOMY     • WOUND DEBRIDEMENT Left 8/17/2016    Procedure: DEBRIDEMENT UPPER EXTREMITY (WASH OUT) AND WOUND CLOSURE;  Surgeon: Laron Quispe MD;  [facility];  Service:    • WOUND DEBRIDEMENT Left 8/15/2016    Procedure: DEBRIDEMENT WOUND AND DRESSING CHANGE (WASH OUT). Left forearm;  Surgeon: Venecia Cabezas MD;  [facility];  Service:    • WOUND DEBRIDEMENT Left 8/13/2016    Procedure: DEBRIDEMENT WOUND (WASH OUT);  Surgeon: Venecia Cabezas MD;  [facility];  Service:      Family History   Problem Relation Age of Onset   • Autoimmune disease Mother    • Stomach cancer Father    • Liver disease Father    • Diabetes Father    • Lupus Sister    • Diabetes Brother    • Rheum arthritis Sister    • Bipolar disorder Sister    • Breast cancer Paternal Grandmother 80      reports that she quit smoking about 23 years ago. Her smoking use included cigarettes. She started smoking about 45 years ago. She has a 22 pack-year smoking history. She has never used smokeless tobacco. She reports current alcohol use of about 6.0 standard drinks of alcohol per week. She reports that she does not use drugs.  Current Outpatient Medications   Medication Instructions   • baclofen 20 mg, Oral, 3 times daily   • cycloSPORINE (RESTASIS) 0.05 % ophthalmic emulsion    • meloxicam (MOBIC) 7.5 mg, Oral, Daily   • oxyCODONE (ROXICODONE) 5 mg, Oral, Every 6 hours PRN   • predniSONE 50 mg, Oral, Daily, Take with food   No Known Allergies   Current Outpatient Medications on File  Prior to Visit   Medication Sig Dispense Refill   • baclofen 20 mg tablet Take 1 tablet (20 mg total) by mouth 3 (three) times a day (Patient not taking: Reported on 3/3/2025) 20 tablet 0   • cycloSPORINE (RESTASIS) 0.05 % ophthalmic emulsion  (Patient not taking: Reported on 3/3/2025)     • meloxicam (Mobic) 7.5 mg tablet Take 1 tablet (7.5 mg total) by mouth daily for 14 days (Patient not taking: Reported on 3/3/2025) 14 tablet 0   • oxyCODONE (Roxicodone) 5 immediate release tablet Take 1 tablet (5 mg total) by mouth every 6 (six) hours as needed for moderate pain for up to 8 doses Max Daily Amount: 20 mg (Patient not taking: Reported on 3/3/2025) 8 tablet 0   • predniSONE 50 mg tablet Take 1 tablet (50 mg total) by mouth daily Take with food (Patient not taking: Reported on 3/3/2025) 5 tablet 0     No current facility-administered medications on file prior to visit.      Social History     Tobacco Use   • Smoking status: Former     Current packs/day: 0.00     Average packs/day: 1 pack/day for 22.0 years (22.0 ttl pk-yrs)     Types: Cigarettes     Start date:      Quit date:      Years since quittin.1   • Smokeless tobacco: Never   Vaping Use   • Vaping status: Never Used   Substance and Sexual Activity   • Alcohol use: Yes     Alcohol/week: 6.0 standard drinks of alcohol     Types: 6 Cans of beer per week     Comment: social   • Drug use: Never   • Sexual activity: Yes     Partners: Male        Objective   /82 (BP Location: Left arm, Patient Position: Sitting, Cuff Size: Standard)   Wt 73.7 kg (162 lb 6.4 oz)   BMI 26.21 kg/m²      Physical Exam  Vitals reviewed.   Constitutional:       Appearance: She is normal weight.   HENT:      Head: Normocephalic and atraumatic.   Abdominal:      General: There is no distension.      Palpations: There is no mass.      Tenderness: There is no abdominal tenderness. There is no right CVA tenderness, left CVA tenderness, guarding or rebound.      Hernia:  No hernia is present.   Genitourinary:     General: Normal vulva.      Labia:         Right: No rash, tenderness, lesion or injury.         Left: No rash, tenderness, lesion or injury.       Urethra: No prolapse, urethral pain, urethral swelling or urethral lesion.      Vagina: Normal.      Cervix: Normal. No cervical motion tenderness.      Uterus: Normal.       Adnexa:         Right: No mass, tenderness or fullness.          Left: No mass, tenderness or fullness.     Skin:     General: Skin is warm and dry.   Neurological:      Mental Status: She is alert.   Psychiatric:         Mood and Affect: Mood normal.         Behavior: Behavior normal.         Thought Content: Thought content normal.         Judgment: Judgment normal.

## 2025-02-28 ENCOUNTER — APPOINTMENT (OUTPATIENT)
Dept: PHYSICAL THERAPY | Facility: CLINIC | Age: 60
End: 2025-02-28
Payer: COMMERCIAL

## 2025-03-03 ENCOUNTER — OFFICE VISIT (OUTPATIENT)
Dept: OBGYN CLINIC | Facility: CLINIC | Age: 60
End: 2025-03-03
Payer: COMMERCIAL

## 2025-03-03 VITALS — BODY MASS INDEX: 26.21 KG/M2 | WEIGHT: 162.4 LBS | DIASTOLIC BLOOD PRESSURE: 82 MMHG | SYSTOLIC BLOOD PRESSURE: 140 MMHG

## 2025-03-03 DIAGNOSIS — N94.9 ADNEXAL CYST: Primary | ICD-10-CM

## 2025-03-03 DIAGNOSIS — N85.9 DISORDER OF ENDOMETRIUM: ICD-10-CM

## 2025-03-03 DIAGNOSIS — R10.9 ABDOMINAL PAIN, UNSPECIFIED ABDOMINAL LOCATION: ICD-10-CM

## 2025-03-03 PROBLEM — T85.848A PAIN FROM IMPLANTED HARDWARE: Status: RESOLVED | Noted: 2017-02-26 | Resolved: 2025-03-03

## 2025-03-03 PROCEDURE — 99204 OFFICE O/P NEW MOD 45 MIN: CPT | Performed by: PHYSICIAN ASSISTANT

## 2025-03-03 NOTE — ASSESSMENT & PLAN NOTE
Orders:  •  US pelvis complete w transvaginal; Future  We did review findings on US. Given that she is not bleeding, and denies any h/o  bleeding, will plan to continue to monitor for now. No changes in EL since last US done 12/23. Aware if any changes on EL on US or any  bleeding that I would advise endometrial biopsy. Pt agreeable.

## 2025-05-05 ENCOUNTER — OFFICE VISIT (OUTPATIENT)
Dept: FAMILY MEDICINE CLINIC | Facility: CLINIC | Age: 60
End: 2025-05-05
Payer: COMMERCIAL

## 2025-05-05 VITALS
OXYGEN SATURATION: 95 % | WEIGHT: 160 LBS | TEMPERATURE: 98 F | RESPIRATION RATE: 16 BRPM | HEART RATE: 93 BPM | DIASTOLIC BLOOD PRESSURE: 70 MMHG | HEIGHT: 66 IN | SYSTOLIC BLOOD PRESSURE: 116 MMHG | BODY MASS INDEX: 25.71 KG/M2

## 2025-05-05 DIAGNOSIS — J06.9 ACUTE UPPER RESPIRATORY INFECTION: Primary | ICD-10-CM

## 2025-05-05 PROCEDURE — 99213 OFFICE O/P EST LOW 20 MIN: CPT | Performed by: NURSE PRACTITIONER

## 2025-05-05 RX ORDER — ALBUTEROL SULFATE 90 UG/1
2 INHALANT RESPIRATORY (INHALATION) EVERY 6 HOURS PRN
Qty: 18 G | Refills: 0 | Status: SHIPPED | OUTPATIENT
Start: 2025-05-05

## 2025-05-05 RX ORDER — BROMPHENIRAMINE MALEATE, PSEUDOEPHEDRINE HYDROCHLORIDE, AND DEXTROMETHORPHAN HYDROBROMIDE 2; 30; 10 MG/5ML; MG/5ML; MG/5ML
5 SYRUP ORAL 4 TIMES DAILY PRN
Qty: 120 ML | Refills: 0 | Status: SHIPPED | OUTPATIENT
Start: 2025-05-05

## 2025-05-05 NOTE — PROGRESS NOTES
Name: Patricia Redmond      : 1965      MRN: [id number]  Encounter Provider: FLOYD Tavarez  Encounter Date: 2025   Encounter department: [facility]  :  Assessment & Plan  Acute upper respiratory infection  Viral versus allergy component.  Will treat as below headache treatment.  Monitor for any high-grade fevers, or worsening symptoms, and follow-up as needed  Orders:  •  brompheniramine-pseudoephedrine-DM 30-2-10 MG/5ML syrup; Take 5 mL by mouth 4 (four) times a day as needed for allergies  •  albuterol (ProAir HFA) 90 mcg/act inhaler; Inhale 2 puffs every 6 (six) hours as needed for wheezing           History of Present Illness   She has been having upper respiratory symptoms for the past 3 to 4 days.  Temp last night 100.4.  Has continued cough, postnasal drip, and congestion, as well as bodyaches    Cough  This is a new problem. The current episode started in the past 7 days. The problem has been gradually worsening. The problem occurs every few hours. The cough is Non-productive. Associated symptoms include chills, a fever, headaches, nasal congestion and postnasal drip. Pertinent negatives include no chest pain, ear congestion, ear pain, heartburn, hemoptysis, myalgias, rash, rhinorrhea, sore throat, shortness of breath, sweats, weight loss or wheezing.     Review of Systems   Constitutional:  Positive for chills and fever. Negative for fatigue and weight loss.   HENT:  Positive for postnasal drip. Negative for congestion, ear pain, rhinorrhea, sinus pressure and sore throat.    Respiratory:  Positive for cough. Negative for hemoptysis, shortness of breath and wheezing.    Cardiovascular:  Negative for chest pain.   Gastrointestinal:  Negative for abdominal pain, diarrhea, heartburn, nausea and vomiting.   Musculoskeletal:  Positive for back pain. Negative for arthralgias and myalgias.   Skin:  Negative for rash.   Neurological:  Positive for headaches.       Objective   BP  "116/70   Pulse 93   Temp 98 °F (36.7 °C)   Resp 16   Ht 5' 6\" (1.676 m)   Wt 72.6 kg (160 lb)   SpO2 95%   BMI 25.82 kg/m²      Physical Exam  Vitals and nursing note reviewed.   Constitutional:       General: She is not in acute distress.     Appearance: Normal appearance.   HENT:      Head: Normocephalic and atraumatic.      Right Ear: Tympanic membrane normal.      Left Ear: Tympanic membrane normal.      Nose: Congestion present.      Mouth/Throat:      Pharynx: Oropharynx is clear.   Eyes:      Conjunctiva/sclera: Conjunctivae normal.   Neck:      Vascular: No carotid bruit.   Cardiovascular:      Rate and Rhythm: Normal rate and regular rhythm.      Pulses: Normal pulses.      Heart sounds: Normal heart sounds. No murmur heard.  Pulmonary:      Effort: Pulmonary effort is normal.      Breath sounds: Normal breath sounds.   Lymphadenopathy:      Cervical: No cervical adenopathy.   Skin:     General: Skin is warm and dry.   Neurological:      Mental Status: She is alert.   Psychiatric:         Mood and Affect: Mood normal.         Behavior: Behavior normal.         "

## (undated) DEVICE — GLOVE INDICATOR PI UNDERGLOVE SZ 7.5 BLUE

## (undated) DEVICE — SPONGE PVP SCRUB WING STERILE

## (undated) DEVICE — BULB SYRINGE,IRRIGATION WITH PROTECTIVE CAP: Brand: DOVER

## (undated) DEVICE — CULTURE TUBE AEROBIC

## (undated) DEVICE — GLOVE SRG BIOGEL 7.5

## (undated) DEVICE — CULTURE TUBE ANAEROBIC

## (undated) DEVICE — DRAPE C-ARM X-RAY

## (undated) DEVICE — CUFF TOURNIQUET 18 X 4 IN QUICK CONNECT DISP 1 BLADDER

## (undated) DEVICE — CHLORAPREP HI-LITE 26ML ORANGE

## (undated) DEVICE — INTENDED FOR TISSUE SEPARATION, AND OTHER PROCEDURES THAT REQUIRE A SHARP SURGICAL BLADE TO PUNCTURE OR CUT.: Brand: BARD-PARKER SAFETY BLADES SIZE 10, STERILE

## (undated) DEVICE — OCCLUSIVE GAUZE STRIP,3% BISMUTH TRIBROMOPHENATE IN PETROLATUM BLEND: Brand: XEROFORM

## (undated) DEVICE — ACE WRAP 4 IN UNSTERILE

## (undated) DEVICE — GAUZE SPONGES,16 PLY: Brand: CURITY

## (undated) DEVICE — INTENDED FOR TISSUE SEPARATION, AND OTHER PROCEDURES THAT REQUIRE A SHARP SURGICAL BLADE TO PUNCTURE OR CUT.: Brand: BARD-PARKER SAFETY BLADES SIZE 15, STERILE

## (undated) DEVICE — UNIVERSAL MAJOR EXTREMITY,KIT: Brand: CARDINAL HEALTH

## (undated) DEVICE — PADDING CAST 4 IN  COTTON STRL

## (undated) DEVICE — SYRINGE 1ML TB 25G X 5/8 NON SAFETY